# Patient Record
Sex: FEMALE | Race: WHITE | NOT HISPANIC OR LATINO | Employment: STUDENT | URBAN - METROPOLITAN AREA
[De-identification: names, ages, dates, MRNs, and addresses within clinical notes are randomized per-mention and may not be internally consistent; named-entity substitution may affect disease eponyms.]

---
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
## 2019-01-17 ENCOUNTER — VIRTUAL VISIT (OUTPATIENT)
Dept: FAMILY MEDICINE | Facility: OTHER | Age: 19
End: 2019-01-17

## 2019-01-17 NOTE — PROGRESS NOTES
"Date:   Clinician: Ricardo Mace  Clinician NPI: [id number]  Patient: Celina Fam  Patient : 2000  Patient Address: [address] [place] [address], [place] [address]  Patient Phone: [phone]  Visit Protocol: General skin conditions  Patient Summary:  Celina is a 18 year old ( : 2000 ) female who initiated a Visit for evaluation of an unspecified skin condition. When asked the question \"Please sign me up to receive news, health information and promotions from [facility].\", Celina responded \"No\".    Images of her skin condition were uploaded.  Her symptoms started 4-6 days ago and affect the right side of her body. The skin condition is located on her hands. The skin condition is yellow and white in color.   It feels tender to touch, painful, and warm to touch. The symptoms interfere with her sleep.   Symptom details   Pain: The pain is moderate (between 4-6 on a 10 point pain scale).   The skin condition has changed since the symptoms started. Description of changes as reported by the patient (free text): More swollen and infected   Denied symptoms include scabs, pimples, sores, burning, numbness, crusts, hives, dry/flaky skin, blisters, itchiness, and drainage. Celina does not feel feverish. She does not have a rash in the shape of a bull's-eye.   Treatments or home remedies used to relieve the symptoms as reported by the patient (free text): Antiobiotic ointment, did not help   Precipitating events   Celina did not come in contact with any irritants prior to the onset of her symptoms and has not been in close contact with anyone that has similar symptoms. She also did not spend time in a wooded area, swim, travel, or spend excess time in the sun just before her symptoms started. Celina did not get bitten or stung by an insect.   Pertinent medical history  Celina has not experienced this skin condition before.   Celina has not had chickenpox and has not had shingles in the past. She received the " varicella vaccine.    Celina does not have a history or a family history of atopia. Ongoing medical conditions were denied.   Weight: 118 lbs   Celina does not smoke or use smokeless tobacco.   She denies pregnancy and denies breastfeeding. She is currently menstruating.   MEDICATIONS: ADRIÁN (28) oral, ALLERGIES: NKDA  Clinician Response:  Dear Paul Patrick   Diagnosis: Cellulitis of unspecified finger  Diagnosis ICD: L03.019  Prescription: cephalexin (Keflex) 500 mg oral capsule 30 capsule, 10 days supply. Take 1 capsule by mouth every 8 hours for 10 days. Refills: 0, Refill as needed: no, Allow substitutions: yes  Pharmacy: [facility] - [phone] - [address] [place] [address]

## 2019-02-13 ENCOUNTER — VIRTUAL VISIT (OUTPATIENT)
Dept: FAMILY MEDICINE | Facility: CLINIC | Age: 19
End: 2019-02-13
Payer: COMMERCIAL

## 2019-02-13 DIAGNOSIS — L70.8 OTHER ACNE: ICD-10-CM

## 2019-02-13 DIAGNOSIS — L70.9 ACNE, UNSPECIFIED ACNE TYPE: Primary | ICD-10-CM

## 2019-02-13 PROCEDURE — 99441 ZZC PHYSICIAN TELEPHONE EVALUATION 5-10 MIN: CPT | Performed by: NURSE PRACTITIONER

## 2019-02-13 RX ORDER — MINOCYCLINE HYDROCHLORIDE 50 MG/1
50 CAPSULE ORAL 2 TIMES DAILY
Qty: 60 CAPSULE | Refills: 1 | Status: SHIPPED | OUTPATIENT
Start: 2019-02-13 | End: 2019-03-15 | Stop reason: ALTCHOICE

## 2019-02-13 RX ORDER — MINOCYCLINE HYDROCHLORIDE 50 MG/1
50 CAPSULE ORAL 2 TIMES DAILY
Qty: 60 CAPSULE | Refills: 1 | Status: SHIPPED | OUTPATIENT
Start: 2019-02-13 | End: 2019-02-13

## 2019-02-13 NOTE — PROGRESS NOTES
"Celina Fam is a 18 year old female who is being evaluated via a telephone visit.      The patient has been notified of following (by CHILO Davidson       \"We have found that certain health care needs can be provided without the need for a physical exam.  This service lets us provide the care you need with a short phone conversation.  If a prescription is necessary we can send it directly to your pharmacy.  If lab work is needed we can place an order for that and you can then stop by our lab to have the test done at a later time.    This telephone visit will be conducted via 3 way call with the you (the patient) , the physician/provider, and a me all on the line at the same time.  This allows your physician/provider to have the phone conversation with you while I will be taking notes for your medical record.  We will have full access to your [place] medical record during this entire phone call.    Since this is like an office visit,  will bill your insurance company for this service.  Please check with your medical insurance if this type of telephone/virtual is covered . You may be responsible for the cost of this service if insurance coverage is denied.  The typical cost is $30 (10min), $59(11-20min) and $85 (21-30min)     If during the course of the call the physician/provider feels a telephone visit is not appropriate, you will not be charged for this service\"    Consent has been obtained for this service by care team member: yes.  See the scanned image in the medical record.    S: The history as provided by the patient to the provider during this 3 way call include PMH, medication check    Pertinent parts of the the patient's medical history reviewed and confirmed by the provider included : PMH, medication.    Total time of call between patient and provider was 5 minutes     Susan Haase, CNP (MD signature)  ===================================================  Celina Fam is a 18 year old " female who is being evaluated via a telephone visit.      Issues with acne for the last year, present around mouth, forehead, smaller in size.  Denies on back or chest.  Using topical clindamycin and taking indigo without relief.     I have reviewed the note as documented above.  This accurately captures the substance of my conversation with the patient,      Assessment/Plan:  Celina was seen today for derm problem.    Diagnoses and all orders for this visit:    Acne, unspecified acne type: use OTC acne wash, apply clindamycin bid, continue indigo, take minocycline bid with food  -     minocycline (MINOCIN/DYNACIN) 50 MG capsule; Take 1 capsule (50 mg) by mouth 2 times daily  -     benzoyl peroxide (BREVOXYL) 4 % external gel; Apply to face once daily at night    Follow up in 3 months, sooner as needed.

## 2019-03-13 ENCOUNTER — TELEPHONE (OUTPATIENT)
Dept: FAMILY MEDICINE | Facility: CLINIC | Age: 19
End: 2019-03-13

## 2019-03-13 DIAGNOSIS — L70.9 ACNE, UNSPECIFIED ACNE TYPE: Primary | ICD-10-CM

## 2019-03-13 NOTE — TELEPHONE ENCOUNTER
Pt has Derm appt on June 4th. Pt wondering if she could try amoxicillin for her acne. Friend uses it and has worked great. Please use the [facility] pharmacy.    Blanquita San/

## 2019-03-14 NOTE — TELEPHONE ENCOUNTER
Amoxicillin does not treat the organisim C. acnes that causes acne, only the medications that are in the cycline group treat this bacteria, the other medication that is recommended if you can not tolerate or allergic to  cycline is bactrim.   Thanks,  Susan Haase, CNP

## 2019-03-14 NOTE — TELEPHONE ENCOUNTER
Pt is willing to try this if you think it would be beneficial since the minocycline is not working

## 2019-03-15 RX ORDER — SULFAMETHOXAZOLE/TRIMETHOPRIM 800-160 MG
1 TABLET ORAL 2 TIMES DAILY
Qty: 60 TABLET | Refills: 0 | Status: SHIPPED | OUTPATIENT
Start: 2019-03-15 | End: 2019-07-22

## 2019-03-25 ENCOUNTER — TELEPHONE (OUTPATIENT)
Dept: FAMILY MEDICINE | Facility: CLINIC | Age: 19
End: 2019-03-25

## 2019-03-25 DIAGNOSIS — Z30.41 ENCOUNTER FOR SURVEILLANCE OF CONTRACEPTIVE PILLS: ICD-10-CM

## 2019-03-25 RX ORDER — DROSPIRENONE AND ETHINYL ESTRADIOL 0.02-3(28)
1 KIT ORAL DAILY
Qty: 84 TABLET | Refills: 2 | Status: SHIPPED | OUTPATIENT
Start: 2019-03-25 | End: 2019-07-22

## 2019-03-25 NOTE — TELEPHONE ENCOUNTER
Has refills  Prescription approved per [facility] Refill Protocol.  Elisabeth Brice RN, BSN

## 2019-07-22 ENCOUNTER — OFFICE VISIT (OUTPATIENT)
Dept: FAMILY MEDICINE | Facility: CLINIC | Age: 19
End: 2019-07-22
Payer: COMMERCIAL

## 2019-07-22 VITALS
RESPIRATION RATE: 16 BRPM | TEMPERATURE: 98.1 F | HEIGHT: 61 IN | OXYGEN SATURATION: 99 % | BODY MASS INDEX: 23.79 KG/M2 | DIASTOLIC BLOOD PRESSURE: 62 MMHG | SYSTOLIC BLOOD PRESSURE: 90 MMHG | WEIGHT: 126 LBS | HEART RATE: 79 BPM

## 2019-07-22 DIAGNOSIS — L40.9 PSORIASIS OF SCALP: Primary | ICD-10-CM

## 2019-07-22 DIAGNOSIS — Z11.3 SCREEN FOR STD (SEXUALLY TRANSMITTED DISEASE): ICD-10-CM

## 2019-07-22 DIAGNOSIS — Z30.41 ENCOUNTER FOR SURVEILLANCE OF CONTRACEPTIVE PILLS: ICD-10-CM

## 2019-07-22 PROCEDURE — 87491 CHLMYD TRACH DNA AMP PROBE: CPT | Performed by: NURSE PRACTITIONER

## 2019-07-22 PROCEDURE — 99213 OFFICE O/P EST LOW 20 MIN: CPT | Performed by: NURSE PRACTITIONER

## 2019-07-22 PROCEDURE — 87591 N.GONORRHOEAE DNA AMP PROB: CPT | Performed by: NURSE PRACTITIONER

## 2019-07-22 RX ORDER — KETOCONAZOLE 20 MG/ML
SHAMPOO TOPICAL
Qty: 500 ML | Refills: 1 | Status: SHIPPED | OUTPATIENT
Start: 2019-07-22 | End: 2020-12-10

## 2019-07-22 RX ORDER — DESOXIMETASONE 2.5 MG/G
CREAM TOPICAL 2 TIMES DAILY
Qty: 60 G | Refills: 0 | Status: SHIPPED | OUTPATIENT
Start: 2019-07-22 | End: 2020-12-10

## 2019-07-22 RX ORDER — DROSPIRENONE AND ETHINYL ESTRADIOL 0.02-3(28)
1 KIT ORAL DAILY
Qty: 84 TABLET | Refills: 3 | Status: SHIPPED | OUTPATIENT
Start: 2019-07-22 | End: 2020-11-25

## 2019-07-22 ASSESSMENT — ANXIETY QUESTIONNAIRES
3. WORRYING TOO MUCH ABOUT DIFFERENT THINGS: SEVERAL DAYS
1. FEELING NERVOUS, ANXIOUS, OR ON EDGE: SEVERAL DAYS
IF YOU CHECKED OFF ANY PROBLEMS ON THIS QUESTIONNAIRE, HOW DIFFICULT HAVE THESE PROBLEMS MADE IT FOR YOU TO DO YOUR WORK, TAKE CARE OF THINGS AT HOME, OR GET ALONG WITH OTHER PEOPLE: NOT DIFFICULT AT ALL
5. BEING SO RESTLESS THAT IT IS HARD TO SIT STILL: NOT AT ALL
6. BECOMING EASILY ANNOYED OR IRRITABLE: SEVERAL DAYS
2. NOT BEING ABLE TO STOP OR CONTROL WORRYING: NOT AT ALL

## 2019-07-22 ASSESSMENT — PATIENT HEALTH QUESTIONNAIRE - PHQ9
SUM OF ALL RESPONSES TO PHQ QUESTIONS 1-9: 2
5. POOR APPETITE OR OVEREATING: NOT AT ALL

## 2019-07-22 ASSESSMENT — MIFFLIN-ST. JEOR: SCORE: 1278.91

## 2019-07-22 NOTE — LETTER
July 24, 2019      Cleina Fam  [address] [facility] [address]        Dear ,    We are writing to inform you of your test results.    Your tests for gonorrhea and chlamydia are negative.     Resulted Orders   NEISSERIA GONORRHOEA PCR   Result Value Ref Range    Specimen Descrip Vagina     N Gonorrhea PCR Negative NEG^Negative      Comment:      Negative for N. gonorrhoeae rRNA by transcription mediated amplification.  A negative result by transcription mediated amplification does not preclude   the presence of N. gonorrhoeae infection because results are dependent on   proper and adequate collection, absence of inhibitors, and sufficient rRNA to   be detected.     CHLAMYDIA TRACHOMATIS PCR   Result Value Ref Range    Specimen Description Vagina     Chlamydia Trachomatis PCR Negative NEG^Negative      Comment:      Negative for C. trachomatis rRNA by transcription mediated amplification.  A negative result by transcription mediated amplification does not preclude   the presence of C. trachomatis infection because results are dependent on   proper and adequate collection, absence of inhibitors, and sufficient rRNA to   be detected.         If you have any questions or concerns, please call the clinic at the number listed above.       Sincerely,        Susan Haase, APRN CNP

## 2019-07-22 NOTE — PROGRESS NOTES
"Subjective     Celina Fam is a 19 year old female who presents to clinic today for the following health issues:    HPI   Rash    Description:   Location: scalp  Character: flaky  Itching (Pruritis): YES    Progression of Symptoms:  worsening    Accompanying Signs & Symptoms:  Fever: no   Body aches or joint pain: no   Sore throat symptoms: no   Recent cold symptoms: no     History:   Previous similar rash: YES    Precipitating factors:   Exposure to similar rash: no   New exposures: None   Recent travel: no     Alleviating factors:  Left side of scalp with itching, dandruff, has been using head and shoulders without relief.  Area of dryness and itching has increased in size.       Therapies Tried and outcome: see above.     Birth control: taking indigo, good control of acne.               Reviewed and updated as needed this visit by Provider         Review of Systems   ROS COMP: CONSTITUTIONAL: NEGATIVE for fever, chills, change in weight  INTEGUMENTARY/SKIN: see HPI  RESP: NEGATIVE for significant cough or SOB  CV: NEGATIVE for chest pain, palpitations or peripheral edema  : normal menstrual cycles  PSYCHIATRIC: NEGATIVE for changes in mood or affect      Objective    BP 90/62 (BP Location: Right arm, Patient Position: Chair, Cuff Size: Adult Regular)   Pulse 79   Temp 98.1  F (36.7  C) (Oral)   Resp 16   Ht 1.549 m (5' 1\")   Wt 57.2 kg (126 lb)   SpO2 99%   BMI 23.81 kg/m    Body mass index is 23.81 kg/m .  Physical Exam   GENERAL: healthy, alert and no distress  SKIN: left side of scalp with a 3 cm dry and scaly plaque.   PSYCH: mentation appears normal, affect normal/bright          Assessment & Plan   Assessment      Plan  1. Psoriasis of scalp: left side of scalp, see below    - ketoconazole (NIZORAL) 2 % external shampoo; Apply shampoo to scalp every week,  wash off after 5 minutes.  Dispense: 500 mL; Refill: 1  - desoximetasone (TOPICORT) 0.25 % external cream; Apply topically 2 times daily  " Dispense: 60 g; Refill: 0    2. Screen for STD (sexually transmitted disease)  - NEISSERIA GONORRHOEA PCR  - CHLAMYDIA TRACHOMATIS PCR      Return in about 1 year (around 7/22/2020) for Routine Visit.    Susan Haase, APRN [facility]

## 2020-11-25 DIAGNOSIS — Z30.41 ENCOUNTER FOR SURVEILLANCE OF CONTRACEPTIVE PILLS: ICD-10-CM

## 2020-11-25 RX ORDER — DROSPIRENONE AND ETHINYL ESTRADIOL TABLETS 0.02-3(28)
KIT ORAL
Qty: 84 TABLET | Refills: 0 | Status: SHIPPED | OUTPATIENT
Start: 2020-11-25 | End: 2020-12-10

## 2020-12-02 ENCOUNTER — IMMUNIZATION (OUTPATIENT)
Dept: FAMILY MEDICINE | Facility: CLINIC | Age: 20
End: 2020-12-02
Payer: COMMERCIAL

## 2020-12-02 DIAGNOSIS — Z23 NEED FOR PROPHYLACTIC VACCINATION AND INOCULATION AGAINST INFLUENZA: Primary | ICD-10-CM

## 2020-12-02 PROCEDURE — 90473 IMMUNE ADMIN ORAL/NASAL: CPT

## 2020-12-02 PROCEDURE — 99207 PR NO CHARGE NURSE ONLY: CPT

## 2020-12-02 PROCEDURE — 90672 LAIV4 VACCINE INTRANASAL: CPT

## 2020-12-10 ENCOUNTER — OFFICE VISIT (OUTPATIENT)
Dept: FAMILY MEDICINE | Facility: CLINIC | Age: 20
End: 2020-12-10
Payer: COMMERCIAL

## 2020-12-10 VITALS
RESPIRATION RATE: 16 BRPM | HEART RATE: 78 BPM | TEMPERATURE: 98.2 F | WEIGHT: 125.6 LBS | BODY MASS INDEX: 23.71 KG/M2 | OXYGEN SATURATION: 100 % | HEIGHT: 61 IN | SYSTOLIC BLOOD PRESSURE: 100 MMHG | DIASTOLIC BLOOD PRESSURE: 66 MMHG

## 2020-12-10 DIAGNOSIS — Z11.59 NEED FOR HEPATITIS C SCREENING TEST: ICD-10-CM

## 2020-12-10 DIAGNOSIS — Z30.41 ENCOUNTER FOR SURVEILLANCE OF CONTRACEPTIVE PILLS: ICD-10-CM

## 2020-12-10 DIAGNOSIS — Z12.4 SCREENING FOR MALIGNANT NEOPLASM OF CERVIX: ICD-10-CM

## 2020-12-10 DIAGNOSIS — F32.5 MAJOR DEPRESSION IN REMISSION (H): ICD-10-CM

## 2020-12-10 DIAGNOSIS — Z00.00 ROUTINE GENERAL MEDICAL EXAMINATION AT A HEALTH CARE FACILITY: Primary | ICD-10-CM

## 2020-12-10 DIAGNOSIS — Z11.3 SCREENING FOR STDS (SEXUALLY TRANSMITTED DISEASES): ICD-10-CM

## 2020-12-10 DIAGNOSIS — D64.9 ANEMIA, UNSPECIFIED TYPE: ICD-10-CM

## 2020-12-10 DIAGNOSIS — Z11.4 SCREENING FOR HIV (HUMAN IMMUNODEFICIENCY VIRUS): ICD-10-CM

## 2020-12-10 LAB
BASOPHILS # BLD AUTO: 0 10E9/L (ref 0–0.2)
BASOPHILS NFR BLD AUTO: 0.7 %
DIFFERENTIAL METHOD BLD: ABNORMAL
EOSINOPHIL # BLD AUTO: 0.1 10E9/L (ref 0–0.7)
EOSINOPHIL NFR BLD AUTO: 1.1 %
ERYTHROCYTE [DISTWIDTH] IN BLOOD BY AUTOMATED COUNT: 18.2 % (ref 10–15)
HCT VFR BLD AUTO: 38.8 % (ref 35–47)
HGB BLD-MCNC: 12.2 G/DL (ref 11.7–15.7)
LYMPHOCYTES # BLD AUTO: 2.1 10E9/L (ref 0.8–5.3)
LYMPHOCYTES NFR BLD AUTO: 39.2 %
MCH RBC QN AUTO: 24.4 PG (ref 26.5–33)
MCHC RBC AUTO-ENTMCNC: 31.4 G/DL (ref 31.5–36.5)
MCV RBC AUTO: 77 FL (ref 78–100)
MONOCYTES # BLD AUTO: 0.4 10E9/L (ref 0–1.3)
MONOCYTES NFR BLD AUTO: 6.8 %
NEUTROPHILS # BLD AUTO: 2.8 10E9/L (ref 1.6–8.3)
NEUTROPHILS NFR BLD AUTO: 52.2 %
PLATELET # BLD AUTO: 303 10E9/L (ref 150–450)
RBC # BLD AUTO: 5.01 10E12/L (ref 3.8–5.2)
WBC # BLD AUTO: 5.4 10E9/L (ref 4–11)

## 2020-12-10 PROCEDURE — 90471 IMMUNIZATION ADMIN: CPT | Performed by: NURSE PRACTITIONER

## 2020-12-10 PROCEDURE — 87389 HIV-1 AG W/HIV-1&-2 AB AG IA: CPT | Performed by: NURSE PRACTITIONER

## 2020-12-10 PROCEDURE — 99395 PREV VISIT EST AGE 18-39: CPT | Mod: 25 | Performed by: NURSE PRACTITIONER

## 2020-12-10 PROCEDURE — G0145 SCR C/V CYTO,THINLAYER,RESCR: HCPCS | Performed by: NURSE PRACTITIONER

## 2020-12-10 PROCEDURE — 90715 TDAP VACCINE 7 YRS/> IM: CPT | Performed by: NURSE PRACTITIONER

## 2020-12-10 PROCEDURE — 86803 HEPATITIS C AB TEST: CPT | Performed by: NURSE PRACTITIONER

## 2020-12-10 PROCEDURE — 85025 COMPLETE CBC W/AUTO DIFF WBC: CPT | Performed by: NURSE PRACTITIONER

## 2020-12-10 PROCEDURE — 83550 IRON BINDING TEST: CPT | Performed by: NURSE PRACTITIONER

## 2020-12-10 PROCEDURE — 83540 ASSAY OF IRON: CPT | Performed by: NURSE PRACTITIONER

## 2020-12-10 PROCEDURE — 36415 COLL VENOUS BLD VENIPUNCTURE: CPT | Performed by: NURSE PRACTITIONER

## 2020-12-10 RX ORDER — DROSPIRENONE AND ETHINYL ESTRADIOL 0.02-3(28)
1 KIT ORAL DAILY
Qty: 84 TABLET | Refills: 3 | Status: SHIPPED | OUTPATIENT
Start: 2020-12-10 | End: 2020-12-10

## 2020-12-10 RX ORDER — DROSPIRENONE AND ETHINYL ESTRADIOL 0.02-3(28)
1 KIT ORAL DAILY
Qty: 84 TABLET | Refills: 3 | Status: SHIPPED | OUTPATIENT
Start: 2020-12-10 | End: 2021-09-21

## 2020-12-10 ASSESSMENT — ENCOUNTER SYMPTOMS
HEARTBURN: 0
WEAKNESS: 0
MYALGIAS: 0
NERVOUS/ANXIOUS: 0
SHORTNESS OF BREATH: 0
DIZZINESS: 0
NAUSEA: 0
BREAST MASS: 0
DYSURIA: 0
HEADACHES: 0
SORE THROAT: 0
DIARRHEA: 0
CONSTIPATION: 0
JOINT SWELLING: 0
ABDOMINAL PAIN: 0
HEMATOCHEZIA: 0
FEVER: 0
ARTHRALGIAS: 0
HEMATURIA: 0
PARESTHESIAS: 0
FREQUENCY: 0
PALPITATIONS: 0
COUGH: 0
CHILLS: 0
EYE PAIN: 0

## 2020-12-10 ASSESSMENT — PATIENT HEALTH QUESTIONNAIRE - PHQ9
SUM OF ALL RESPONSES TO PHQ QUESTIONS 1-9: 0
SUM OF ALL RESPONSES TO PHQ QUESTIONS 1-9: 0
10. IF YOU CHECKED OFF ANY PROBLEMS, HOW DIFFICULT HAVE THESE PROBLEMS MADE IT FOR YOU TO DO YOUR WORK, TAKE CARE OF THINGS AT HOME, OR GET ALONG WITH OTHER PEOPLE: NOT DIFFICULT AT ALL

## 2020-12-10 ASSESSMENT — ANXIETY QUESTIONNAIRES
GAD7 TOTAL SCORE: 1
5. BEING SO RESTLESS THAT IT IS HARD TO SIT STILL: NOT AT ALL
4. TROUBLE RELAXING: NOT AT ALL
1. FEELING NERVOUS, ANXIOUS, OR ON EDGE: NOT AT ALL
GAD7 TOTAL SCORE: 1
GAD7 TOTAL SCORE: 1
2. NOT BEING ABLE TO STOP OR CONTROL WORRYING: NOT AT ALL
7. FEELING AFRAID AS IF SOMETHING AWFUL MIGHT HAPPEN: NOT AT ALL
3. WORRYING TOO MUCH ABOUT DIFFERENT THINGS: NOT AT ALL
7. FEELING AFRAID AS IF SOMETHING AWFUL MIGHT HAPPEN: NOT AT ALL
6. BECOMING EASILY ANNOYED OR IRRITABLE: SEVERAL DAYS

## 2020-12-10 ASSESSMENT — MIFFLIN-ST. JEOR: SCORE: 1272.1

## 2020-12-10 NOTE — PROGRESS NOTES
SUBJECTIVE:   CC: Celina Fam is an 20 year old woman who presents for preventive health visit.     Patient has been advised of split billing requirements and indicates understanding: Yes     HPI  Answers for HPI/ROS submitted by the patient on 12/10/2020   Annual Exam:  If you checked off any problems, how difficult have these problems made it for you to do your work, take care of things at home, or get along with other people?: Not difficult at all  PHQ9 TOTAL SCORE: 0  HALI 7 TOTAL SCORE: 1    Recheck iron levels.   Mole check: 2 on her back and 1 behind left ear, unsure if they have gotten larger.    Menses: monthly, light, minimal cramping, taking OCP.  Follows vegan diet, is taking multivitamin with iron, requests iron check.   Depression: PHQ 9 of 0.   Social: will be finishing her final semester of college this spring, will be working as an  when done.   Today's PHQ-2 Score:   PHQ-2 ( 1999 [employer]) 7/22/2019   Q1: Little interest or pleasure in doing things 0   Q2: Feeling down, depressed or hopeless 0   PHQ-2 Score 0   Q1: Little interest or pleasure in doing things -   Q2: Feeling down, depressed or hopeless -   PHQ-2 Score -     Abuse: Current or Past (Physical, Sexual or Emotional) - No  Do you feel safe in your environment? Yes    Social History     Tobacco Use     Smoking status: Never Smoker     Smokeless tobacco: Never Used   Substance Use Topics     Alcohol use: Yes     Comment: occ       Alcohol Use 5/22/2018   Prescreen: >3 drinks/day or >7 drinks/week? No   Prescreen: >3 drinks/day or >7 drinks/week? -     Reviewed orders with patient.  Reviewed health maintenance and updated orders accordingly - Yes  Lab work is in process  Labs reviewed in EPIC  BP Readings from Last 3 Encounters:   12/10/20 100/66   07/22/19 90/62   05/22/18 100/60    Wt Readings from Last 3 Encounters:   12/10/20 57 kg (125 lb 9.6 oz)   07/22/19 57.2 kg (126 lb)   05/22/18 55.3 kg (122 lb) (40 %, Z= -0.24)*      * Growth percentiles are based on CDC (Girls, 2-20 Years) data.                  Patient Active Problem List   Diagnosis     Acne     Anxiety     Mild recurrent major depression (H)     Past Surgical History:   Procedure Laterality Date     TONSILLECTOMY, ADENOIDECTOMY, COMBINED  age 6 or 7     TYMPANOPLASTY, RT/LT  toddler and age 4    Tympanoplasty RT/LT       Social History     Tobacco Use     Smoking status: Never Smoker     Smokeless tobacco: Never Used   Substance Use Topics     Alcohol use: Yes     Comment: occ     Family History   Problem Relation Age of Onset     Family History Negative Mother      Family History Negative Father      Thyroid Disease Maternal Grandmother         hyperactive     Alzheimer Disease Paternal Grandfather      Family History Negative Sister      Family History Negative Brother          Current Outpatient Medications   Medication Sig Dispense Refill     desoximetasone (TOPICORT) 0.25 % external cream Apply topically 2 times daily 60 g 0     ketoconazole (NIZORAL) 2 % external shampoo Apply shampoo to scalp every week,  wash off after 5 minutes. 500 mL 1     LORYNA 3-0.02 MG tablet TAKE ONE TABLET BY MOUTH ONCE DAILY 84 tablet 0     Allergies   Allergen Reactions     Bactrim [Sulfamethoxazole W/Trimethoprim] Itching     Nkda [No Known Drug Allergies]        Mammogram not appropriate for this patient based on age.    Pertinent mammograms are reviewed under the imaging tab.  History of abnormal Pap smear: NO - age 21-29 PAP every 3 years recommended     Reviewed and updated as needed this visit by clinical staff                 Reviewed and updated as needed this visit by Provider                  Review of Systems  CONSTITUTIONAL: NEGATIVE for fever, chills, change in weight  INTEGUMENTARU/SKIN: see HPI  EYES: NEGATIVE for vision changes or irritation  ENT: NEGATIVE for ear, mouth and throat problems  RESP: NEGATIVE for significant cough or SOB  BREAST: NEGATIVE for masses,  "tenderness or discharge  CV: NEGATIVE for chest pain, palpitations or peripheral edema  GI: NEGATIVE for nausea, abdominal pain, heartburn, or change in bowel habits  : NEGATIVE for unusual urinary or vaginal symptoms. Periods are regular.  MUSCULOSKELETAL: NEGATIVE for significant arthralgias or myalgia  NEURO: NEGATIVE for weakness, dizziness or paresthesias  PSYCHIATRIC: NEGATIVE for changes in mood or affect     OBJECTIVE:   /66 (BP Location: Right arm, Patient Position: Chair, Cuff Size: Adult Regular)   Pulse 78   Temp 98.2  F (36.8  C) (Oral)   Resp 16   Ht 1.549 m (5' 1\")   Wt 57 kg (125 lb 9.6 oz)   SpO2 100%   BMI 23.73 kg/m    Physical Exam  GENERAL: healthy, alert and no distress  EYES: Eyes grossly normal to inspection, PERRL and conjunctivae and sclerae normal  HENT: ear canals and TM's normal, nose and mouth without ulcers or lesions  NECK: no adenopathy, no asymmetry, masses, or scars and thyroid normal to palpation  RESP: lungs clear to auscultation - no rales, rhonchi or wheezes  BREAST: normal without masses, tenderness or nipple discharge and no palpable axillary masses or adenopathy  CV: regular rate and rhythm, normal S1 S2, no S3 or S4, no murmur, click or rub, no peripheral edema  ABDOMEN: soft, nontender, no hepatosplenomegaly, no masses and bowel sounds normal   (female): normal female external genitalia, normal urethral meatus, vaginal mucosa pink, moist, well rugated, and normal cervix/adnexa/uterus without masses or discharge, 5 mm nevus in center of mons pubis  MS: no gross musculoskeletal defects noted, no edema  SKIN: numerous moles on back, raised nevus behind the left ear,   NEURO: Normal strength and tone, mentation intact and speech normal  PSYCH: mentation appears normal, affect normal/bright        ASSESSMENT/PLAN:   Celina was seen today for physical.    Diagnoses and all orders for this visit:    Routine general medical examination at a health care facility  -  " "   DERMATOLOGY ADULT REFERRAL; Future    Screening for HIV (human immunodeficiency virus)  -     HIV Antigen Antibody Combo    Need for hepatitis C screening test  -     Hepatitis C Screen Reflex to HCV RNA Quant and Genotype    Screening for malignant neoplasm of cervix  -     Pap imaged thin layer screen only - recommended age 21 - 24 years    Screening for STDs (sexually transmitted diseases): unable to complete gonorrhea and chlamydia due to test shortage, asymptomatic    Encounter for surveillance of contraceptive pills: refill.  -       -     drospirenone-ethinyl estradiol (LORYNA) 3-0.02 MG tablet; Take 1 tablet by mouth daily    Anemia, unspecified type:  Follows vegan diet  -     CBC with platelets differential  -     Iron and iron binding capacity    Major depression in remission (H) :PHQ 9 of 0    Other orders  -     REVIEW OF HEALTH MAINTENANCE PROTOCOL ORDERS  -     TDAP VACCINE (Adacel, Boostrix)  [[id number]]        Patient has been advised of split billing requirements and indicates understanding: No  COUNSELING:  Reviewed preventive health counseling, as reflected in patient instructions       Regular exercise       Healthy diet/nutrition    Estimated body mass index is 23.81 kg/m  as calculated from the following:    Height as of 7/22/19: 1.549 m (5' 1\").    Weight as of 7/22/19: 57.2 kg (126 lb).        She reports that she has never smoked. She has never used smokeless tobacco.      Counseling Resources:  ATP IV Guidelines  Pooled Cohorts Equation Calculator  Breast Cancer Risk Calculator  BRCA-Related Cancer Risk Assessment: FHS-7 Tool  FRAX Risk Assessment  ICSI Preventive Guidelines  Dietary Guidelines for Americans, 2010  USDA's MyPlate  ASA Prophylaxis  Lung CA Screening  Follow up in 1 year, sooner as needed.   Susan Haase, APRN [facility]"

## 2020-12-10 NOTE — LETTER
[facility]  [address] [place], [place], [address]  [phone]        December 14, 2020    Celina Fam                                                                                                                                                       [address] [facility] [address]          Shan Patrick,     Your lab results are as below:     1)  Hemoglobin is normal at 12.2, the MCV and MCH are low which indicate slight iron deficiency anemia.   2)  Iron saturation is low and iron binding is elevated, these tests also indicate iron deficiency.  Please take an iron supplement on a daily basis.   3)  Screening for HIV and Hepatitis C are negative.     If you have any questions do not hesitate to call the clinic to discuss the results with me further.     Sincerely,     Susan Haase, CNP

## 2020-12-11 LAB
HCV AB SERPL QL IA: NONREACTIVE
HIV 1+2 AB+HIV1 P24 AG SERPL QL IA: NONREACTIVE
IRON SATN MFR SERPL: 9 % (ref 15–46)
IRON SERPL-MCNC: 53 UG/DL (ref 35–180)
TIBC SERPL-MCNC: 558 UG/DL (ref 240–430)

## 2020-12-11 ASSESSMENT — ANXIETY QUESTIONNAIRES: GAD7 TOTAL SCORE: 1

## 2020-12-14 LAB
COPATH REPORT: NORMAL
PAP: NORMAL

## 2021-03-15 ENCOUNTER — VIRTUAL VISIT (OUTPATIENT)
Dept: FAMILY MEDICINE | Facility: CLINIC | Age: 21
End: 2021-03-15
Payer: COMMERCIAL

## 2021-03-15 DIAGNOSIS — F41.9 ANXIETY: Primary | ICD-10-CM

## 2021-03-15 DIAGNOSIS — R41.840 ATTENTION AND CONCENTRATION DEFICIT: ICD-10-CM

## 2021-03-15 PROCEDURE — 99213 OFFICE O/P EST LOW 20 MIN: CPT | Mod: TEL | Performed by: NURSE PRACTITIONER

## 2021-03-15 RX ORDER — BUPROPION HYDROCHLORIDE 150 MG/1
150 TABLET ORAL EVERY MORNING
Qty: 30 TABLET | Refills: 1 | Status: SHIPPED | OUTPATIENT
Start: 2021-03-15 | End: 2021-04-26

## 2021-03-15 ASSESSMENT — ANXIETY QUESTIONNAIRES
IF YOU CHECKED OFF ANY PROBLEMS ON THIS QUESTIONNAIRE, HOW DIFFICULT HAVE THESE PROBLEMS MADE IT FOR YOU TO DO YOUR WORK, TAKE CARE OF THINGS AT HOME, OR GET ALONG WITH OTHER PEOPLE: VERY DIFFICULT
5. BEING SO RESTLESS THAT IT IS HARD TO SIT STILL: NEARLY EVERY DAY
GAD7 TOTAL SCORE: 5
2. NOT BEING ABLE TO STOP OR CONTROL WORRYING: NOT AT ALL
3. WORRYING TOO MUCH ABOUT DIFFERENT THINGS: NOT AT ALL
7. FEELING AFRAID AS IF SOMETHING AWFUL MIGHT HAPPEN: NOT AT ALL
6. BECOMING EASILY ANNOYED OR IRRITABLE: NOT AT ALL
1. FEELING NERVOUS, ANXIOUS, OR ON EDGE: NOT AT ALL

## 2021-03-15 ASSESSMENT — PATIENT HEALTH QUESTIONNAIRE - PHQ9
5. POOR APPETITE OR OVEREATING: MORE THAN HALF THE DAYS
SUM OF ALL RESPONSES TO PHQ QUESTIONS 1-9: 5

## 2021-03-15 NOTE — PROGRESS NOTES
Celina is a 20 year old who is being evaluated via a billable telephone visit.      What phone number would you like to be contacted at? [phone]  How would you like to obtain your AVS? Eugene    Assessment & Plan     Anxiety:  HALI 7 score of 5, Adult ADHD self report ASRS with 4/6 items on ADHD score which indicates a strong likely farley of ADHD, will start on Wellbutrin to see if it helps with focus,     - bupropion (WELLBUTRIN XL) 150 MG 24 hr tablet; Take 1 tablet (150 mg) by mouth every morning    Attention and concentration deficit:  See above     :150    Return in about 6 weeks (around 4/26/2021) for Routine Visit, 6 weeks virtual for anxiety.    Susan Haase, APRN CNP  [facility]    Subjective     HPI     Concern -  memory concern  Onset: several years  Description: short term memory concern  Progression of Symptoms:  worsening  Accompanying Signs & Symptoms: none  Previous history of similar problem: none  Precipitating factors:        Worsened by: none  Alleviating factors:        Improved by: slowing down during the day  Therapies tried and outcome: trying to me more aware of surroundings  Main symptoms are difficulty with wrapping up details of a project, difficulty sitting still without fidgeting, constantly feeling compelled to do things, misplace or have difficulty finding things, feel restless, have difficulty unwinding.   Her Father has ADD and she sees her actions being just like his.  She is very nervous about finishing college and starting a job, she is worried these behavior are going to impact her getting and keeping a job.        Review of Systems   CONSTITUTIONAL: NEGATIVE for fever, chills, change in weight  RESP: NEGATIVE for significant cough or SOB  CV: NEGATIVE for chest pain, palpitations or peripheral edema  NEURO: NEGATIVE for weakness, dizziness or paresthesias  PSYCHIATRIC: see HPI      Objective           Vitals:  No vitals were obtained today due to  virtual visit.    Physical Exam   PSYCH: Alert and oriented times 3; coherent speech, normal   rate and volume, able to articulate logical thoughts, able   to abstract reason, no tangential thoughts, no hallucinations   or delusions  Her affect is normal and pleasant  RESP: No cough, no audible wheezing, able to talk in full sentences  Remainder of exam unable to be completed due to telephone visits              Phone call duration:  20 minutes

## 2021-03-16 ASSESSMENT — ANXIETY QUESTIONNAIRES: GAD7 TOTAL SCORE: 5

## 2021-04-26 ENCOUNTER — VIRTUAL VISIT (OUTPATIENT)
Dept: FAMILY MEDICINE | Facility: CLINIC | Age: 21
End: 2021-04-26
Payer: COMMERCIAL

## 2021-04-26 DIAGNOSIS — F41.9 ANXIETY: Primary | ICD-10-CM

## 2021-04-26 PROCEDURE — 99213 OFFICE O/P EST LOW 20 MIN: CPT | Mod: 95 | Performed by: NURSE PRACTITIONER

## 2021-04-26 ASSESSMENT — PATIENT HEALTH QUESTIONNAIRE - PHQ9
5. POOR APPETITE OR OVEREATING: SEVERAL DAYS
SUM OF ALL RESPONSES TO PHQ QUESTIONS 1-9: 4

## 2021-04-26 ASSESSMENT — ANXIETY QUESTIONNAIRES
GAD7 TOTAL SCORE: 2
5. BEING SO RESTLESS THAT IT IS HARD TO SIT STILL: SEVERAL DAYS
IF YOU CHECKED OFF ANY PROBLEMS ON THIS QUESTIONNAIRE, HOW DIFFICULT HAVE THESE PROBLEMS MADE IT FOR YOU TO DO YOUR WORK, TAKE CARE OF THINGS AT HOME, OR GET ALONG WITH OTHER PEOPLE: NOT DIFFICULT AT ALL
7. FEELING AFRAID AS IF SOMETHING AWFUL MIGHT HAPPEN: NOT AT ALL
6. BECOMING EASILY ANNOYED OR IRRITABLE: NOT AT ALL
2. NOT BEING ABLE TO STOP OR CONTROL WORRYING: NOT AT ALL
3. WORRYING TOO MUCH ABOUT DIFFERENT THINGS: NOT AT ALL
1. FEELING NERVOUS, ANXIOUS, OR ON EDGE: NOT AT ALL

## 2021-04-26 NOTE — PROGRESS NOTES
Celina is a 21 year old who is being evaluated via a billable telephone visit.      What phone number would you like to be contacted at? [phone]  How would you like to obtain your AVS? Eugene    Assessment & Plan     Anxiety  : continues to have difficulty with focus, etc while taking Wellbutrin, will stop medication.  Discussed follow up for formal ADD testing at [facility] or [facility].     Follow up in about 3 months after ADD testing has been completed.    Susan Haase, APRN [facility]   Celina is a 21 year old who presents for the following health issues     HPI     Medication Follow up of Wellbutrin    Taking Medication as prescribed: yes    Side Effects:  None    Medication Helping Symptoms:  Haven't noticed    ADD symptoms:  Was seen on 3/15 for evaluation for ADD, we started on Wellbutrin 150 mg daily, has been taking as prescribed without any improvement in ADD symptoms.          Review of Systems   CONSTITUTIONAL: NEGATIVE for fever, chills, change in weight  RESP: NEGATIVE for significant cough or SOB  CV: NEGATIVE for chest pain, palpitations or peripheral edema  PSYCHIATRIC: see HPI      Objective         Vitals:  No vitals were obtained today due to virtual visit.    Physical Exam   PSYCH: Alert and oriented times 3; coherent speech, normal   rate and volume, able to articulate logical thoughts, able   to abstract reason, no tangential thoughts, no hallucinations   or delusions  Her affect is normal  RESP: No cough, no audible wheezing, able to talk in full sentences  Remainder of exam unable to be completed due to telephone visits        Phone call duration: 7 minutes

## 2021-04-27 ASSESSMENT — ANXIETY QUESTIONNAIRES: GAD7 TOTAL SCORE: 2

## 2021-07-14 ENCOUNTER — OFFICE VISIT (OUTPATIENT)
Dept: FAMILY MEDICINE | Facility: CLINIC | Age: 21
End: 2021-07-14
Payer: COMMERCIAL

## 2021-07-14 ENCOUNTER — TELEPHONE (OUTPATIENT)
Dept: FAMILY MEDICINE | Facility: CLINIC | Age: 21
End: 2021-07-14

## 2021-07-14 VITALS
RESPIRATION RATE: 16 BRPM | OXYGEN SATURATION: 100 % | DIASTOLIC BLOOD PRESSURE: 62 MMHG | SYSTOLIC BLOOD PRESSURE: 115 MMHG | WEIGHT: 132 LBS | BODY MASS INDEX: 24.94 KG/M2 | HEART RATE: 77 BPM

## 2021-07-14 DIAGNOSIS — R19.7 DIARRHEA, UNSPECIFIED TYPE: ICD-10-CM

## 2021-07-14 DIAGNOSIS — D50.9 IRON DEFICIENCY ANEMIA, UNSPECIFIED IRON DEFICIENCY ANEMIA TYPE: ICD-10-CM

## 2021-07-14 DIAGNOSIS — R74.8 ELEVATED LIVER ENZYMES: Primary | ICD-10-CM

## 2021-07-14 DIAGNOSIS — K29.00 ACUTE GASTRITIS WITHOUT HEMORRHAGE, UNSPECIFIED GASTRITIS TYPE: Primary | ICD-10-CM

## 2021-07-14 LAB
ALBUMIN SERPL-MCNC: 3.6 G/DL (ref 3.4–5)
ALP SERPL-CCNC: 66 U/L (ref 40–150)
ALT SERPL W P-5'-P-CCNC: 148 U/L
ANION GAP SERPL CALCULATED.3IONS-SCNC: 7 MMOL/L (ref 3–14)
AST SERPL W P-5'-P-CCNC: 88 U/L (ref 0–45)
BILIRUB SERPL-MCNC: 0.3 MG/DL (ref 0.2–1.3)
BUN SERPL-MCNC: 5 MG/DL (ref 7–30)
CALCIUM SERPL-MCNC: 9.1 MG/DL (ref 8.5–10.1)
CHLORIDE BLD-SCNC: 105 MMOL/L
CO2 SERPL-SCNC: 25 MMOL/L (ref 20–32)
CREAT SERPL-MCNC: 0.88 MG/DL
CRP SERPL-MCNC: <2.9 MG/L (ref 0–8)
ERYTHROCYTE [DISTWIDTH] IN BLOOD BY AUTOMATED COUNT: 16.6 % (ref 10–15)
ERYTHROCYTE [SEDIMENTATION RATE] IN BLOOD BY WESTERGREN METHOD: 5 MM/HR (ref 0–20)
GFR SERPL CREATININE-BSD FRML MDRD: >90 ML/MIN/1.73M2
GLUCOSE BLD-MCNC: 88 MG/DL (ref 70–99)
HCT VFR BLD AUTO: 36.5 % (ref 35–47)
HGB BLD-MCNC: 11.3 G/DL (ref 11.7–15.7)
MCH RBC QN AUTO: 23.6 PG (ref 26.5–33)
MCHC RBC AUTO-ENTMCNC: 31 G/DL (ref 31.5–36.5)
MCV RBC AUTO: 76 FL (ref 78–100)
PLATELET # BLD AUTO: 271 10E3/UL (ref 150–450)
POTASSIUM BLD-SCNC: 4.1 MMOL/L (ref 3.4–5.3)
PROT SERPL-MCNC: 6.8 G/DL (ref 6.8–8.8)
RBC # BLD AUTO: 4.78 10E6/UL (ref 3.8–5.2)
SODIUM SERPL-SCNC: 137 MMOL/L (ref 133–144)
WBC # BLD AUTO: 4.3 10E3/UL (ref 4–11)

## 2021-07-14 PROCEDURE — 99213 OFFICE O/P EST LOW 20 MIN: CPT | Performed by: PHYSICIAN ASSISTANT

## 2021-07-14 PROCEDURE — 85652 RBC SED RATE AUTOMATED: CPT | Performed by: PHYSICIAN ASSISTANT

## 2021-07-14 PROCEDURE — 36415 COLL VENOUS BLD VENIPUNCTURE: CPT | Performed by: PHYSICIAN ASSISTANT

## 2021-07-14 PROCEDURE — 83516 IMMUNOASSAY NONANTIBODY: CPT | Performed by: PHYSICIAN ASSISTANT

## 2021-07-14 PROCEDURE — 85027 COMPLETE CBC AUTOMATED: CPT | Performed by: PHYSICIAN ASSISTANT

## 2021-07-14 PROCEDURE — 80053 COMPREHEN METABOLIC PANEL: CPT | Performed by: PHYSICIAN ASSISTANT

## 2021-07-14 PROCEDURE — 86140 C-REACTIVE PROTEIN: CPT | Performed by: PHYSICIAN ASSISTANT

## 2021-07-14 NOTE — TELEPHONE ENCOUNTER
----- Message from Nafisa Thomas PA-C sent at 7/14/2021  9:01 AM CDT -----  Please call patient and let her know that ESR (inflammatory marker is normal). Hemoglobin is slightly low at 11.3. Levels are showing this is likely iron deficiency. I would recommend starting an iron supplement. It is best taken with orange juice or sometime with vitamin C to help with absorption. Iron can cause constipation and upset stomach. If she would like she can try taking 1/2 tablet OTC every other day to start with. We will see what other testing shows.

## 2021-07-14 NOTE — TELEPHONE ENCOUNTER
Called patient to confirm details surrounding drinking history. Patient states that she usually drinks every weekend. Usually about 5 drinks when she does that consist of either wine or vodka mixed with water or soda.     Patient will call back to schedule labs to be taken place in one month.     Routing to PA to inform.     ADRIANA BustillosN, RN  [facility]

## 2021-07-14 NOTE — TELEPHONE ENCOUNTER
Called patient to inform of message below. Patient states that she has been taking an iron supplement occasionally, but with no regularity. Patient agrees to follow plan outlined by PCP.     ADRIANA BustillosN, RN  [facility]

## 2021-07-14 NOTE — PROGRESS NOTES
Assessment & Plan     Acute gastritis without hemorrhage, unspecified gastritis type  Patient's symptoms are consistent with gastritis. She is having nausea and stomach irritation as well as occasional vomiting with eating most foods. Testing obtained as noted below. She has attempted to change some of the foods she is eating. She has not found any changes that are particularly helpful.  She would like a phone call with lab results when they return.   Consider PPI once H pylori testing has returned.   CBC is consistent with iron deficiency anemia. Will also await other labs but patient can start iron in the mean time.  - CBC with platelets; Future  - Comprehensive metabolic panel (BMP + Alb, Alk Phos, ALT, AST, Total. Bili, TP); Future  - CRP, inflammation; Future  - ESR: Erythrocyte sedimentation rate; Future  - Tissue transglutaminase kishor IgA and IgG; Future  - Tissue transglutaminase kishor IgA and IgG  - ESR: Erythrocyte sedimentation rate  - CRP, inflammation  - Comprehensive metabolic panel (BMP + Alb, Alk Phos, ALT, AST, Total. Bili, TP)  - CBC with platelets  - Helicobacter pylori Antigen Stool; Future    Diarrhea, unspecified type  As noted above.  - CBC with platelets; Future  - Comprehensive metabolic panel (BMP + Alb, Alk Phos, ALT, AST, Total. Bili, TP); Future  - CRP, inflammation; Future  - ESR: Erythrocyte sedimentation rate; Future  - Tissue transglutaminase kishor IgA and IgG; Future  - Tissue transglutaminase kishor IgA and IgG  - ESR: Erythrocyte sedimentation rate  - CRP, inflammation  - Comprehensive metabolic panel (BMP + Alb, Alk Phos, ALT, AST, Total. Bili, TP)  - CBC with platelets  - Helicobacter pylori Antigen Stool; Future    Declines STI testing today.    Review of the result(s) of each unique test - CBC  Ordering of each unique test  21 minutes spent on the date of the encounter doing chart review, history and exam, documentation and further activities per the note       Patient  "Instructions   Once lab results returned can start medication for discomfort (omeprazole).    If not improving consider GI referral.    TAQUERIA [facility] ANABELL Patrick is a 21 year old who presents for the following health issues     HPI     Abdominal/Flank Pain  Onset/Duration: X2 months  Description:   Character: Fullness and \"Sour stomach feeling\"  Location: epigastric  Radiation: None  Intensity: 7 to 8/10  Progression of Symptoms:  worsening  Accompanying Signs & Symptoms:  Fever/Chills: no  Gas/Bloating: YES- epigastric area  Nausea: YES  Vomitting: YES when its really bad  Diarrhea: YES  Constipation: Sometimes  Dysuria or Hematuria: no  History:   Trauma: no  Previous similar pain: no  Previous tests done: none  Precipitating factors:   Does the pain change with:     Food: YES- worse after eating    Bowel Movement: YES- better with bowel movements    Urination: no   Other factors:  Exercising makes it worse (worsens the discomfort in the stomach)  Therapies tried and outcome: Tried tums but does not help     She notes she did wake with symptoms in the middle of the night. She had a \"sour feeling\" in the stomach and diarrhea.    She notes foods that are bland are tolerable.    Denies any increase with minimal alcohol use.    Patient's last menstrual period was 06/14/2021 (approximate).    Review of Systems   GENERAL:  No fevers  GI:  As noted in HPI        Objective    /62 (BP Location: Right arm, Patient Position: Sitting, Cuff Size: Adult Regular)   Pulse 77   Resp 16   Wt 59.9 kg (132 lb)   LMP 06/14/2021 (Approximate)   SpO2 100%   Breastfeeding No   BMI 24.94 kg/m    Body mass index is 24.94 kg/m .  Physical Exam   GENERAL: No acute distress  HEENT: Normocephalic,  CARDIAC: Regular rate and rhythm. No murmurs.  PULMONARY: Lungs are clear to auscultation bilaterally. No wheezes, rhonchi or crackles.  GI: Active bowel sounds, abdomen is " soft and non-tender  : No CVA tenderness bilaterally.   NEURO: Alert and non-focal      Results for orders placed or performed in visit on 07/14/21 (from the past 24 hour(s))   ESR: Erythrocyte sedimentation rate   Result Value Ref Range    Erythrocyte Sedimentation Rate 5 0 - 20 mm/hr   CBC with platelets   Result Value Ref Range    WBC Count 4.3 4.0 - 11.0 10e3/uL    RBC Count 4.78 3.80 - 5.20 10e6/uL    Hemoglobin 11.3 (L) 11.7 - 15.7 g/dL    Hematocrit 36.5 35.0 - 47.0 %    MCV 76 (L) 78 - 100 fL    MCH 23.6 (L) 26.5 - 33.0 pg    MCHC 31.0 (L) 31.5 - 36.5 g/dL    RDW 16.6 (H) 10.0 - 15.0 %    Platelet Count 271 150 - 450 10e3/uL

## 2021-07-14 NOTE — PATIENT INSTRUCTIONS
Once lab results returned can start medication for discomfort (omeprazole).    If not improving consider GI referral.

## 2021-07-14 NOTE — TELEPHONE ENCOUNTER
Please call patient and let her know that her CRP (inflammatory marker) is normal. Electrolytes and kidney function are normal however she does have some elevated liver enzymes. The pattern of enzyme elevation is consistent with alcohol use. How much is she usually drinking when she does drink? I would recommend cutting back on alcohol use.     Repeat labs in 1 month if not improved should do further testing with ultrasound of the liver and further labs.

## 2021-07-15 LAB
TTG IGA SER-ACNC: 44 U/ML
TTG IGG SER-ACNC: 12 U/ML

## 2021-07-30 ENCOUNTER — TRANSFERRED RECORDS (OUTPATIENT)
Dept: HEALTH INFORMATION MANAGEMENT | Facility: CLINIC | Age: 21
End: 2021-07-30

## 2021-09-07 ENCOUNTER — TELEPHONE (OUTPATIENT)
Dept: FAMILY MEDICINE | Facility: CLINIC | Age: 21
End: 2021-09-07

## 2021-09-07 DIAGNOSIS — R10.13 EPIGASTRIC PAIN: Primary | ICD-10-CM

## 2021-09-07 DIAGNOSIS — R19.7 DIARRHEA, UNSPECIFIED TYPE: ICD-10-CM

## 2021-09-07 NOTE — TELEPHONE ENCOUNTER
Pt requesting a Thyroid and Dairy intolerance lab work orders please    Cate Mccoy/ALAN  [place]---[facility]

## 2021-09-09 ENCOUNTER — LAB (OUTPATIENT)
Dept: LAB | Facility: CLINIC | Age: 21
End: 2021-09-09
Payer: COMMERCIAL

## 2021-09-09 DIAGNOSIS — R19.7 DIARRHEA, UNSPECIFIED TYPE: ICD-10-CM

## 2021-09-09 DIAGNOSIS — R10.13 EPIGASTRIC PAIN: ICD-10-CM

## 2021-09-09 LAB — TSH SERPL DL<=0.005 MIU/L-ACNC: 1.57 MU/L (ref 0.4–4)

## 2021-09-09 PROCEDURE — 36415 COLL VENOUS BLD VENIPUNCTURE: CPT

## 2021-09-09 PROCEDURE — 86003 ALLG SPEC IGE CRUDE XTRC EA: CPT

## 2021-09-09 PROCEDURE — 84443 ASSAY THYROID STIM HORMONE: CPT

## 2021-09-10 ENCOUNTER — TELEPHONE (OUTPATIENT)
Dept: FAMILY MEDICINE | Facility: CLINIC | Age: 21
End: 2021-09-10

## 2021-09-10 DIAGNOSIS — R19.7 DIARRHEA, UNSPECIFIED TYPE: ICD-10-CM

## 2021-09-10 DIAGNOSIS — R10.13 EPIGASTRIC PAIN: Primary | ICD-10-CM

## 2021-09-10 LAB — COW MILK IGE QN: 0.21 KU(A)/L

## 2021-09-15 NOTE — TELEPHONE ENCOUNTER
"CMP supposed to be drawn when patient here last week. Still posted as \"future\". Can we determine with lab if still can use blood from last week or if patient needs to be drawn again. If needs to be drawn again please call patient and apologize, get lab only visit scheduled.  "

## 2021-09-17 ENCOUNTER — TELEPHONE (OUTPATIENT)
Dept: FAMILY MEDICINE | Facility: CLINIC | Age: 21
End: 2021-09-17

## 2021-09-17 ENCOUNTER — LAB (OUTPATIENT)
Dept: LAB | Facility: CLINIC | Age: 21
End: 2021-09-17
Payer: COMMERCIAL

## 2021-09-17 ENCOUNTER — MYC MEDICAL ADVICE (OUTPATIENT)
Dept: FAMILY MEDICINE | Facility: CLINIC | Age: 21
End: 2021-09-17

## 2021-09-17 ENCOUNTER — NURSE TRIAGE (OUTPATIENT)
Dept: NURSING | Facility: CLINIC | Age: 21
End: 2021-09-17

## 2021-09-17 DIAGNOSIS — R19.7 DIARRHEA, UNSPECIFIED TYPE: ICD-10-CM

## 2021-09-17 DIAGNOSIS — R74.8 ELEVATED LIVER ENZYMES: ICD-10-CM

## 2021-09-17 DIAGNOSIS — K90.0 CELIAC DISEASE: ICD-10-CM

## 2021-09-17 DIAGNOSIS — F41.9 ANXIETY: ICD-10-CM

## 2021-09-17 DIAGNOSIS — R74.02 NONSPECIFIC ELEVATION OF LEVELS OF TRANSAMINASE OR LACTIC ACID DEHYDROGENASE (LDH): Primary | ICD-10-CM

## 2021-09-17 DIAGNOSIS — F32.A DEPRESSION, UNSPECIFIED DEPRESSION TYPE: ICD-10-CM

## 2021-09-17 DIAGNOSIS — R74.01 NONSPECIFIC ELEVATION OF LEVELS OF TRANSAMINASE OR LACTIC ACID DEHYDROGENASE (LDH): Primary | ICD-10-CM

## 2021-09-17 DIAGNOSIS — Z91.011 MILK ALLERGY: ICD-10-CM

## 2021-09-17 DIAGNOSIS — R10.13 EPIGASTRIC PAIN: ICD-10-CM

## 2021-09-17 LAB
ALBUMIN SERPL-MCNC: 3.8 G/DL (ref 3.4–5)
ALP SERPL-CCNC: 93 U/L (ref 40–150)
ALT SERPL W P-5'-P-CCNC: 487 U/L (ref 0–50)
ANION GAP SERPL CALCULATED.3IONS-SCNC: 5 MMOL/L (ref 3–14)
AST SERPL W P-5'-P-CCNC: 572 U/L (ref 0–45)
BILIRUB SERPL-MCNC: 0.8 MG/DL (ref 0.2–1.3)
BUN SERPL-MCNC: 5 MG/DL (ref 7–30)
CALCIUM SERPL-MCNC: 9 MG/DL (ref 8.5–10.1)
CHLORIDE BLD-SCNC: 109 MMOL/L (ref 94–109)
CO2 SERPL-SCNC: 26 MMOL/L (ref 20–32)
CREAT SERPL-MCNC: 0.79 MG/DL (ref 0.52–1.04)
GFR SERPL CREATININE-BSD FRML MDRD: >90 ML/MIN/1.73M2
GLUCOSE BLD-MCNC: 82 MG/DL (ref 70–99)
POTASSIUM BLD-SCNC: 4.9 MMOL/L (ref 3.4–5.3)
PROT SERPL-MCNC: 6.9 G/DL (ref 6.8–8.8)
SODIUM SERPL-SCNC: 140 MMOL/L (ref 133–144)

## 2021-09-17 PROCEDURE — 36415 COLL VENOUS BLD VENIPUNCTURE: CPT

## 2021-09-17 PROCEDURE — 80053 COMPREHEN METABOLIC PANEL: CPT

## 2021-09-18 NOTE — TELEPHONE ENCOUNTER
[facility] lab reports critical lab drawn today at 7:22am:  .  Previous AST on 7/14 was elevated at 88. Pt seeing provider for 2 months for epigastric pain. In July this year celiac disease was suspected. Sent for UGI w/ bx.  Paged on-call Dr Murillo @7:34pm in Smart Web. 2nd page sent @7:48pm via [facility] [employer] page . Dr Murillo called back @8:01pm. Critical lab reported to Dr Murillo.     Reason for Disposition    Lab or radiology calling with CRITICAL test results    Additional Information    Negative: Lab calling with strep throat test results and triager can call in prescription    Negative: Lab calling with urinalysis test results and triager can call in prescription    Negative: Medication questions    Negative: Call about patient who is currently hospitalized    Negative: Physician call to PCP    Negative: ED call to PCP    Protocols used: [facility]

## 2021-09-18 NOTE — TELEPHONE ENCOUNTER
Elevated transaminase  Rising  (0-45)  Needs evaluation  Next week OK  CT abd o  1st, ordered  appt thereafter  Attempted phone call: disconnected  [employer] message sent  Arrange scan, appt thereafter  Ramon Murillo MD

## 2021-09-19 ENCOUNTER — HEALTH MAINTENANCE LETTER (OUTPATIENT)
Age: 21
End: 2021-09-19

## 2021-09-20 ENCOUNTER — HOSPITAL ENCOUNTER (OUTPATIENT)
Dept: MRI IMAGING | Facility: CLINIC | Age: 21
End: 2021-09-20
Attending: NURSE PRACTITIONER
Payer: COMMERCIAL

## 2021-09-20 ENCOUNTER — VIRTUAL VISIT (OUTPATIENT)
Dept: FAMILY MEDICINE | Facility: CLINIC | Age: 21
End: 2021-09-20
Payer: COMMERCIAL

## 2021-09-20 ENCOUNTER — HOSPITAL ENCOUNTER (OUTPATIENT)
Dept: CT IMAGING | Facility: CLINIC | Age: 21
End: 2021-09-20
Attending: NURSE PRACTITIONER
Payer: COMMERCIAL

## 2021-09-20 ENCOUNTER — TELEPHONE (OUTPATIENT)
Dept: FAMILY MEDICINE | Facility: CLINIC | Age: 21
End: 2021-09-20

## 2021-09-20 DIAGNOSIS — R74.8 ELEVATED LIVER ENZYMES: ICD-10-CM

## 2021-09-20 DIAGNOSIS — R74.01 NONSPECIFIC ELEVATION OF LEVELS OF TRANSAMINASE OR LACTIC ACID DEHYDROGENASE (LDH): Primary | ICD-10-CM

## 2021-09-20 DIAGNOSIS — R74.02 NONSPECIFIC ELEVATION OF LEVELS OF TRANSAMINASE OR LACTIC ACID DEHYDROGENASE (LDH): Primary | ICD-10-CM

## 2021-09-20 DIAGNOSIS — R93.2 ABNORMAL LIVER CT: Primary | ICD-10-CM

## 2021-09-20 DIAGNOSIS — R74.02 NONSPECIFIC ELEVATION OF LEVELS OF TRANSAMINASE OR LACTIC ACID DEHYDROGENASE (LDH): ICD-10-CM

## 2021-09-20 DIAGNOSIS — R74.01 NONSPECIFIC ELEVATION OF LEVELS OF TRANSAMINASE OR LACTIC ACID DEHYDROGENASE (LDH): ICD-10-CM

## 2021-09-20 DIAGNOSIS — Z91.011 MILK ALLERGY: ICD-10-CM

## 2021-09-20 DIAGNOSIS — R93.2 ABNORMAL LIVER CT: ICD-10-CM

## 2021-09-20 DIAGNOSIS — K90.0 CELIAC DISEASE: ICD-10-CM

## 2021-09-20 LAB
APAP SERPL-MCNC: <2 MG/L (ref 10–30)
HAV IGM SERPL QL IA: NONREACTIVE
HBV CORE AB SERPL QL IA: NONREACTIVE
HBV SURFACE AB SERPL IA-ACNC: 0 M[IU]/ML
HBV SURFACE AG SERPL QL IA: NONREACTIVE
HCG SERPL QL: NEGATIVE
HCV AB SERPL QL IA: NONREACTIVE

## 2021-09-20 PROCEDURE — 80143 DRUG ASSAY ACETAMINOPHEN: CPT

## 2021-09-20 PROCEDURE — 82040 ASSAY OF SERUM ALBUMIN: CPT

## 2021-09-20 PROCEDURE — 255N000002 HC RX 255 OP 636: Performed by: RADIOLOGY

## 2021-09-20 PROCEDURE — 74183 MRI ABD W/O CNTR FLWD CNTR: CPT

## 2021-09-20 PROCEDURE — 87340 HEPATITIS B SURFACE AG IA: CPT

## 2021-09-20 PROCEDURE — 86706 HEP B SURFACE ANTIBODY: CPT

## 2021-09-20 PROCEDURE — 86803 HEPATITIS C AB TEST: CPT

## 2021-09-20 PROCEDURE — 86704 HEP B CORE ANTIBODY TOTAL: CPT

## 2021-09-20 PROCEDURE — 36415 COLL VENOUS BLD VENIPUNCTURE: CPT

## 2021-09-20 PROCEDURE — 86038 ANTINUCLEAR ANTIBODIES: CPT

## 2021-09-20 PROCEDURE — 99214 OFFICE O/P EST MOD 30 MIN: CPT | Mod: 95 | Performed by: NURSE PRACTITIONER

## 2021-09-20 PROCEDURE — 250N000009 HC RX 250: Performed by: NURSE PRACTITIONER

## 2021-09-20 PROCEDURE — 86709 HEPATITIS A IGM ANTIBODY: CPT

## 2021-09-20 PROCEDURE — 74177 CT ABD & PELVIS W/CONTRAST: CPT

## 2021-09-20 PROCEDURE — A9585 GADOBUTROL INJECTION: HCPCS | Performed by: RADIOLOGY

## 2021-09-20 PROCEDURE — 250N000011 HC RX IP 250 OP 636: Performed by: NURSE PRACTITIONER

## 2021-09-20 PROCEDURE — 84703 CHORIONIC GONADOTROPIN ASSAY: CPT

## 2021-09-20 RX ORDER — GADOBUTROL 604.72 MG/ML
7.5 INJECTION INTRAVENOUS ONCE
Status: COMPLETED | OUTPATIENT
Start: 2021-09-20 | End: 2021-09-20

## 2021-09-20 RX ORDER — IOPAMIDOL 755 MG/ML
500 INJECTION, SOLUTION INTRAVASCULAR ONCE
Status: COMPLETED | OUTPATIENT
Start: 2021-09-20 | End: 2021-09-20

## 2021-09-20 RX ADMIN — GADOBUTROL 6 ML: 604.72 INJECTION INTRAVENOUS at 13:52

## 2021-09-20 RX ADMIN — IOPAMIDOL 66 ML: 755 INJECTION, SOLUTION INTRAVENOUS at 10:57

## 2021-09-20 RX ADMIN — SODIUM CHLORIDE 57 ML: 9 INJECTION, SOLUTION INTRAVENOUS at 10:57

## 2021-09-20 NOTE — TELEPHONE ENCOUNTER
MRI ordered and GI referral updated per CT results.   Labs pending.   VANESA Valerio CNP on 9/20/2021 at 12:44 PM'

## 2021-09-20 NOTE — TELEPHONE ENCOUNTER
PAL RN spoke w/pt who verified pt's mom, Zuri (MA @ [facility]) assisting pt w/scheduling of CT Scan which pt is having today, 9/20/2021 @ [facility] @ 10:00 AM for elevated labs of  U/L (0-45 U/L) &  U/L (0-50 U/L).  Pt has a F/U evaluation w/Nafisa Thomas PA-C on Tue 9/21/2021 @ 2:00 PM to review labs and CT Scan results.       Pt verbalized understanding of CT Scan appointment and F/U appointment time and has PAL RN number to call with an questions or concerns.     Ester Cox, Registered Nurse, PAL (Patient Advocate Liaison)   [facility]     [phone]

## 2021-09-20 NOTE — PROGRESS NOTES
Celina is a 21 year old who is being evaluated via a billable telephone visit.      What phone number would you like to be contacted at?   How would you like to obtain your AVS? MyChart    Assessment & Plan     Abnormal liver CT  Elevated liver enzymes  MRI with fibrosis of unknown etiology, neoplasm unlikely. Thorough discussion of cessation of alcohol, supplement, and diet.   Hepatitis and autoimmune labs pending. Is immunized for hepatitis A and B.   GI referral updated.   Discussed red flags and need for urgent follow-up.     Celiac disease  Eliminating Gluten from diet.     Milk allergy  Discussed Whey protein.                    Return in about 1 week (around 9/27/2021) for GI .    VAENSA Valerio CNP  [facility]   Celina is a 21 year old who presents for the following health issues  accompanied by her mother:    HPI     To discuss MRI visit from 9/20/2021 resulting from recommendations per CT.   Patient remains asymptomatic.   Recent diagnosis of celiac disease.   Vegetarian diet with Whey protein intake.   Social alcohol use with recent use on vacation.   No tylenol use.   As needed iron supplementation 2/2 vegetarian diet.   Probiotic use as needed.       Review of Systems   Constitutional, HEENT, cardiovascular, pulmonary, gi and gu systems are negative, except as otherwise noted.      Objective           Vitals:  No vitals were obtained today due to virtual visit.    Physical Exam   healthy, alert and no distress  PSYCH: Alert and oriented times 3; coherent speech, normal   rate and volume, able to articulate logical thoughts, able   to abstract reason, no tangential thoughts, no hallucinations   or delusions  Her affect is normal  RESP: No cough, no audible wheezing, able to talk in full sentences  Remainder of exam unable to be completed due to telephone visits    9/20/2021 Hepatic MRI  IMPRESSION:   1. There are scattered irregular areas of T2 hyperintensity  and T1  hypointensity, most prominent in the left hepatic lobe. The left  hepatic lobe also appears mildly atrophic. Findings suggest areas of  hepatic fibrosis, of uncertain etiology. Hepatic neoplasm is  considered unlikely from this appearance, however clinical correlation  and follow-up are recommended.  2. No biliary dilatation is identified.  3. Moderate amount of stool throughout the visualized portion of the  colon.        Phone call duration: 15 minutes

## 2021-09-20 NOTE — TELEPHONE ENCOUNTER
PCP request to change CT to STAT, PCP not on site. Patient request to have order placed now.   VANESA Valerio CNP on 9/20/2021 at 7:45 AM

## 2021-09-20 NOTE — TELEPHONE ENCOUNTER
Nafisa Thomas PA-C,     Pt was to have referrals to:    1) Behavioral health     2) Nutrition    Are you still interested in these referrals for pt, or are these pending CT scan results?    Kindly advise.    Ester Cox, Registered Nurse, PAL (Patient Advocate Liaison)   [facility]     [phone]

## 2021-09-21 LAB
ALBUMIN SERPL-MCNC: 3.8 G/DL (ref 3.4–5)
ALP SERPL-CCNC: 115 U/L (ref 40–150)
ALT SERPL W P-5'-P-CCNC: 588 U/L (ref 0–50)
ANA SER QL IF: NEGATIVE
ANION GAP SERPL CALCULATED.3IONS-SCNC: 5 MMOL/L (ref 3–14)
AST SERPL W P-5'-P-CCNC: 493 U/L (ref 0–45)
BILIRUB SERPL-MCNC: 0.6 MG/DL (ref 0.2–1.3)
BUN SERPL-MCNC: 4 MG/DL (ref 7–30)
CALCIUM SERPL-MCNC: 8.4 MG/DL (ref 8.5–10.1)
CHLORIDE BLD-SCNC: 105 MMOL/L (ref 94–109)
CO2 SERPL-SCNC: 28 MMOL/L (ref 20–32)
CREAT SERPL-MCNC: 0.61 MG/DL (ref 0.52–1.04)
GFR SERPL CREATININE-BSD FRML MDRD: >90 ML/MIN/1.73M2
GLUCOSE BLD-MCNC: 104 MG/DL (ref 70–99)
POTASSIUM BLD-SCNC: 4.3 MMOL/L (ref 3.4–5.3)
PROT SERPL-MCNC: 6.5 G/DL (ref 6.8–8.8)
SODIUM SERPL-SCNC: 138 MMOL/L (ref 133–144)

## 2021-09-27 ENCOUNTER — TRANSFERRED RECORDS (OUTPATIENT)
Dept: HEALTH INFORMATION MANAGEMENT | Facility: CLINIC | Age: 21
End: 2021-09-27

## 2021-09-27 LAB
ALT SERPL-CCNC: 444 IU/L (ref 0–32)
AST SERPL-CCNC: 335 IU/L (ref 0–40)
CREATININE (EXTERNAL): 0.81 MG/DL (ref 0.57–1)
GFR ESTIMATED (EXTERNAL): 103 ML/MIN/1.73M2
GFR ESTIMATED (IF AFRICAN AMERICAN) (EXTERNAL): 119 ML/MIN/1.73M2
GLUCOSE (EXTERNAL): 96 MG/DL (ref 65–99)
INR (EXTERNAL): 1.1 (ref 0.9–1.2)
POTASSIUM (EXTERNAL): 4.3 MMOL/L (ref 3.5–5.2)

## 2021-09-28 ENCOUNTER — TELEPHONE (OUTPATIENT)
Dept: FAMILY MEDICINE | Facility: CLINIC | Age: 21
End: 2021-09-28

## 2021-09-28 NOTE — TELEPHONE ENCOUNTER
[facility] called back appt set up for in person visit     Friday October 29th @ 1:10 pm   Dr. Jeffery   [address]   [place]   [address]     Paperwork will be mailed to home address     Letter provided to mother with appointment information     Nancy Echols Registered Nurse, PAL (Patient Advocate Liason)   [facility]   [phone]    No

## 2021-09-28 NOTE — TELEPHONE ENCOUNTER
RN spoke to patient's mother Zuri     Patient was seen at [facility] 9/27/2021 for elevated liver enzymes and advised to be seen in 4-6 weeks for follow up and the appt they were able to get is December 6th which is after recommended time     RN placed call to [facility] - to inquire if we can get patient in sooner, message was sent to the patient coordinator to return call directly to this RN     Nancy Echols, Registered Nurse, PAL (Patient Advocate Liason)   [facility]   [phone]

## 2021-09-28 NOTE — LETTER
September 28, 2021      Celina Fam  [address] [address] [id number]            Dear Celina,      An appointment has been scheduled with [facility]     Friday October 29th @ 1:10 pm   Dr. Jeffery   [address]   [place]   [address]       Sincerely,    Nancy Echols, Registered Nurse  [facility]

## 2021-10-01 ENCOUNTER — MEDICAL CORRESPONDENCE (OUTPATIENT)
Dept: HEALTH INFORMATION MANAGEMENT | Facility: CLINIC | Age: 21
End: 2021-10-01

## 2021-10-06 DIAGNOSIS — E50.9 VITAMIN A DEFICIENCY: Primary | ICD-10-CM

## 2021-10-07 NOTE — TELEPHONE ENCOUNTER
RN called RAYNA 10/7/2021 to ensure this appointment is still scheduled, mother was advised by [facility] staff that they didn't see this appt scheduled     RN spoke to scheduling who verified the appointment is scheduled as below   Mother Zuri notified     Nancy Echols, Registered Nurse  [facility]

## 2021-10-08 ENCOUNTER — TELEPHONE (OUTPATIENT)
Dept: INTERVENTIONAL RADIOLOGY/VASCULAR | Facility: CLINIC | Age: 21
End: 2021-10-08

## 2021-10-08 NOTE — TELEPHONE ENCOUNTER
Patient is approved for US guided random liver biopsy by Dr. Butler.  This is a sedation procedure.  History and physical need to be done.  Patient does need INR and platelet labs.  Patient is NOT on blood thinners or aspirin.  Patient will need a COVID test to be coordinated by centralized scheduling.

## 2021-10-11 ENCOUNTER — TELEPHONE (OUTPATIENT)
Dept: FAMILY MEDICINE | Facility: CLINIC | Age: 21
End: 2021-10-11

## 2021-10-11 DIAGNOSIS — Z01.812 ENCOUNTER FOR PREOPERATIVE SCREENING LABORATORY TESTING FOR COVID-19 VIRUS: Primary | ICD-10-CM

## 2021-10-11 DIAGNOSIS — Z11.52 ENCOUNTER FOR PREOPERATIVE SCREENING LABORATORY TESTING FOR COVID-19 VIRUS: Primary | ICD-10-CM

## 2021-10-11 RX ORDER — FENTANYL CITRATE 50 UG/ML
25-50 INJECTION, SOLUTION INTRAMUSCULAR; INTRAVENOUS EVERY 5 MIN PRN
Status: CANCELLED | OUTPATIENT
Start: 2021-10-11

## 2021-10-11 RX ORDER — FLUMAZENIL 0.1 MG/ML
0.2 INJECTION, SOLUTION INTRAVENOUS
Status: CANCELLED | OUTPATIENT
Start: 2021-10-11

## 2021-10-11 RX ORDER — NALOXONE HYDROCHLORIDE 0.4 MG/ML
0.2 INJECTION, SOLUTION INTRAMUSCULAR; INTRAVENOUS; SUBCUTANEOUS
Status: CANCELLED | OUTPATIENT
Start: 2021-10-11

## 2021-10-11 RX ORDER — NALOXONE HYDROCHLORIDE 0.4 MG/ML
0.4 INJECTION, SOLUTION INTRAMUSCULAR; INTRAVENOUS; SUBCUTANEOUS
Status: CANCELLED | OUTPATIENT
Start: 2021-10-11

## 2021-10-11 RX ORDER — LIDOCAINE 40 MG/G
CREAM TOPICAL
Status: CANCELLED | OUTPATIENT
Start: 2021-10-11

## 2021-10-11 NOTE — TELEPHONE ENCOUNTER
COVID-19 testing orders placed for upcoming procedure.   VANESA Valerio CNP on 10/11/2021 at 12:57 PM

## 2021-10-12 ENCOUNTER — LAB (OUTPATIENT)
Dept: LAB | Facility: CLINIC | Age: 21
End: 2021-10-12
Payer: COMMERCIAL

## 2021-10-12 DIAGNOSIS — Z11.52 ENCOUNTER FOR PREOPERATIVE SCREENING LABORATORY TESTING FOR COVID-19 VIRUS: ICD-10-CM

## 2021-10-12 DIAGNOSIS — Z01.812 ENCOUNTER FOR PREOPERATIVE SCREENING LABORATORY TESTING FOR COVID-19 VIRUS: ICD-10-CM

## 2021-10-12 PROCEDURE — U0005 INFEC AGEN DETEC AMPLI PROBE: HCPCS

## 2021-10-12 PROCEDURE — U0003 INFECTIOUS AGENT DETECTION BY NUCLEIC ACID (DNA OR RNA); SEVERE ACUTE RESPIRATORY SYNDROME CORONAVIRUS 2 (SARS-COV-2) (CORONAVIRUS DISEASE [COVID-19]), AMPLIFIED PROBE TECHNIQUE, MAKING USE OF HIGH THROUGHPUT TECHNOLOGIES AS DESCRIBED BY CMS-2020-01-R: HCPCS

## 2021-10-13 ENCOUNTER — TELEPHONE (OUTPATIENT)
Dept: MEDSURG UNIT | Facility: CLINIC | Age: 21
End: 2021-10-13

## 2021-10-13 LAB — SARS-COV-2 RNA RESP QL NAA+PROBE: NEGATIVE

## 2021-10-14 ENCOUNTER — TELEPHONE (OUTPATIENT)
Dept: FAMILY MEDICINE | Facility: CLINIC | Age: 21
End: 2021-10-14

## 2021-10-14 ENCOUNTER — HOSPITAL ENCOUNTER (OUTPATIENT)
Facility: CLINIC | Age: 21
Discharge: HOME | End: 2021-10-14
Admitting: RADIOLOGY
Payer: COMMERCIAL

## 2021-10-14 ENCOUNTER — HOSPITAL ENCOUNTER (OUTPATIENT)
Dept: ULTRASOUND IMAGING | Facility: CLINIC | Age: 21
End: 2021-10-14
Attending: PHYSICIAN ASSISTANT
Payer: COMMERCIAL

## 2021-10-14 VITALS
OXYGEN SATURATION: 97 % | HEART RATE: 86 BPM | RESPIRATION RATE: 16 BRPM | TEMPERATURE: 97.8 F | DIASTOLIC BLOOD PRESSURE: 74 MMHG | SYSTOLIC BLOOD PRESSURE: 123 MMHG

## 2021-10-14 DIAGNOSIS — R79.89 ELEVATED LIVER FUNCTION TESTS: Primary | ICD-10-CM

## 2021-10-14 DIAGNOSIS — R79.89 ELEVATED LIVER FUNCTION TESTS: ICD-10-CM

## 2021-10-14 DIAGNOSIS — R74.8 ELEVATED LIVER ENZYMES: ICD-10-CM

## 2021-10-14 DIAGNOSIS — R74.8 ELEVATED LIVER ENZYMES: Primary | ICD-10-CM

## 2021-10-14 LAB
ALBUMIN SERPL-MCNC: 3.7 G/DL (ref 3.4–5)
ALP SERPL-CCNC: 73 U/L (ref 40–150)
ALT SERPL W P-5'-P-CCNC: 86 U/L (ref 0–50)
ANION GAP SERPL CALCULATED.3IONS-SCNC: 8 MMOL/L (ref 3–14)
AST SERPL W P-5'-P-CCNC: 63 U/L (ref 0–45)
BILIRUB SERPL-MCNC: 0.8 MG/DL (ref 0.2–1.3)
BUN SERPL-MCNC: 8 MG/DL (ref 7–30)
CALCIUM SERPL-MCNC: 8.7 MG/DL (ref 8.5–10.1)
CHLORIDE BLD-SCNC: 109 MMOL/L (ref 94–109)
CO2 SERPL-SCNC: 23 MMOL/L (ref 20–32)
CREAT SERPL-MCNC: 0.75 MG/DL (ref 0.52–1.04)
GFR SERPL CREATININE-BSD FRML MDRD: >90 ML/MIN/1.73M2
GLUCOSE BLD-MCNC: 75 MG/DL (ref 70–99)
INR PPP: 1.04 (ref 0.85–1.15)
PLATELET # BLD AUTO: 219 10E3/UL (ref 150–450)
POTASSIUM BLD-SCNC: 3.9 MMOL/L (ref 3.4–5.3)
PROT SERPL-MCNC: 6.9 G/DL (ref 6.8–8.8)
SODIUM SERPL-SCNC: 140 MMOL/L (ref 133–144)

## 2021-10-14 PROCEDURE — 85049 AUTOMATED PLATELET COUNT: CPT | Performed by: PHYSICIAN ASSISTANT

## 2021-10-14 PROCEDURE — 999N000154 HC STATISTIC RADIOLOGY XRAY, US, CT, MAR, NM

## 2021-10-14 PROCEDURE — 88307 TISSUE EXAM BY PATHOLOGIST: CPT | Mod: TC | Performed by: PHYSICIAN ASSISTANT

## 2021-10-14 PROCEDURE — 272N000431 US BIOPSY LIVER

## 2021-10-14 PROCEDURE — 36415 COLL VENOUS BLD VENIPUNCTURE: CPT | Performed by: PHYSICIAN ASSISTANT

## 2021-10-14 PROCEDURE — 36415 COLL VENOUS BLD VENIPUNCTURE: CPT

## 2021-10-14 PROCEDURE — 250N000011 HC RX IP 250 OP 636: Performed by: PHYSICIAN ASSISTANT

## 2021-10-14 PROCEDURE — 250N000013 HC RX MED GY IP 250 OP 250 PS 637: Performed by: RADIOLOGY

## 2021-10-14 PROCEDURE — 82040 ASSAY OF SERUM ALBUMIN: CPT

## 2021-10-14 PROCEDURE — 85610 PROTHROMBIN TIME: CPT | Performed by: PHYSICIAN ASSISTANT

## 2021-10-14 PROCEDURE — 250N000009 HC RX 250: Performed by: RADIOLOGY

## 2021-10-14 RX ORDER — NALOXONE HYDROCHLORIDE 0.4 MG/ML
0.2 INJECTION, SOLUTION INTRAMUSCULAR; INTRAVENOUS; SUBCUTANEOUS
Status: DISCONTINUED | OUTPATIENT
Start: 2021-10-14 | End: 2021-10-14 | Stop reason: HOSPADM

## 2021-10-14 RX ORDER — NALOXONE HYDROCHLORIDE 0.4 MG/ML
0.4 INJECTION, SOLUTION INTRAMUSCULAR; INTRAVENOUS; SUBCUTANEOUS
Status: DISCONTINUED | OUTPATIENT
Start: 2021-10-14 | End: 2021-10-14 | Stop reason: HOSPADM

## 2021-10-14 RX ORDER — LIDOCAINE HYDROCHLORIDE 10 MG/ML
10 INJECTION, SOLUTION EPIDURAL; INFILTRATION; INTRACAUDAL; PERINEURAL ONCE
Status: COMPLETED | OUTPATIENT
Start: 2021-10-14 | End: 2021-10-14

## 2021-10-14 RX ORDER — FLUMAZENIL 0.1 MG/ML
0.2 INJECTION, SOLUTION INTRAVENOUS
Status: DISCONTINUED | OUTPATIENT
Start: 2021-10-14 | End: 2021-10-14 | Stop reason: HOSPADM

## 2021-10-14 RX ORDER — LIDOCAINE 40 MG/G
CREAM TOPICAL
Status: DISCONTINUED | OUTPATIENT
Start: 2021-10-14 | End: 2021-10-14 | Stop reason: HOSPADM

## 2021-10-14 RX ORDER — ACETAMINOPHEN 325 MG/1
650 TABLET ORAL ONCE
Status: COMPLETED | OUTPATIENT
Start: 2021-10-14 | End: 2021-10-14

## 2021-10-14 RX ORDER — FENTANYL CITRATE 50 UG/ML
25-50 INJECTION, SOLUTION INTRAMUSCULAR; INTRAVENOUS EVERY 5 MIN PRN
Status: DISCONTINUED | OUTPATIENT
Start: 2021-10-14 | End: 2021-10-14 | Stop reason: HOSPADM

## 2021-10-14 RX ADMIN — FENTANYL CITRATE 50 MCG: 50 INJECTION, SOLUTION INTRAMUSCULAR; INTRAVENOUS at 13:06

## 2021-10-14 RX ADMIN — ACETAMINOPHEN 650 MG: 325 TABLET, FILM COATED ORAL at 13:43

## 2021-10-14 RX ADMIN — MIDAZOLAM HYDROCHLORIDE 1 MG: 1 INJECTION, SOLUTION INTRAMUSCULAR; INTRAVENOUS at 13:06

## 2021-10-14 RX ADMIN — LIDOCAINE HYDROCHLORIDE 10 ML: 10 INJECTION, SOLUTION EPIDURAL; INFILTRATION; INTRACAUDAL; PERINEURAL at 13:06

## 2021-10-14 NOTE — PROGRESS NOTES
Sedation Post Procedure Summary:    Immediately prior to starting the procedure a Time Out was conducted with procedural staff and re-confirmed the patient s name, procedure, and site/side. Md completed sedation assessment.     Consent obtained from patient after discussing the risks, benefits and alternatives.       Indication:  Sedation was required to allow for liver biopsy    Sedatives: Fentanyl 50 Mcg and Versed 1 Mg    Vital signs, airway, and pulse oximetry were monitored throughout the procedure and sedation was maintained until the procedure was complete.      Patient tolerance: Patient tolerated the procedure well with no immediate complications. Site dressed by tech with band aid at completion. No bleeding or drainage noted.    Post-procedure report given to: Whit PEÑA and pt transferred to  7by cart.    (See Doc Flow-sheets and MAR for additional information)    Ricardo Mobley RN

## 2021-10-14 NOTE — PROGRESS NOTES
Care Suites Post Procedure Note    Patient Information  Name: Celina Fam  Age: 21 year old    Post Procedure  Time patient returned to Care Suites: 125  Concerns/abnormal assessment: R shoulder pain  If abnormal assessment, provider notified: Yes  Plan/Other: tylenol for pain, monitor, site CDI    Whit Bedoya RN

## 2021-10-14 NOTE — PROVIDER NOTIFICATION
Homer PIKE web paged. ? Order for new pregnancy test prior to US liver bx. Neg test on 9/20/21.  Ok per Homer to not recheck pregnancy test. Pt denies pregnancy

## 2021-10-14 NOTE — PRE-PROCEDURE
GENERAL PRE-PROCEDURE:   Procedure:  Liver biopsy  Date/Time:  10/14/2021 12:31 PM    Written consent obtained?: Yes    Risks and benefits: Risks, benefits and alternatives were discussed    Consent given by:  Patient  Patient states understanding of procedure being performed: Yes    Patient's understanding of procedure matches consent: Yes    Procedure consent matches procedure scheduled: Yes    Expected level of sedation:  Moderate  Appropriately NPO:  Yes  Mallampati  :  Grade 1- soft palate, uvula, tonsillar pillars, and posterior pharyngeal wall visible  Lungs:  Lungs clear with good breath sounds bilaterally  Heart:  Normal heart sounds and rate  History & Physical reviewed:  History and physical reviewed and no updates needed  Statement of review:  I have reviewed the lab findings, diagnostic data, medications, and the plan for sedation

## 2021-10-14 NOTE — PROVIDER NOTIFICATION
Dr Orourke notified of 5/10 R should pain. Tylenol given per order x 1. Will notify if pain increases

## 2021-10-14 NOTE — PROGRESS NOTES
Care Suites Admission Nursing Note    Patient Information  Name: Celina Fam  Age: 21 year old  Reason for admission: US Liver Biopsy  Care Suites arrival time: 11:23 am    Visitor Information  Name: staci Keating  Informed of visitor restrictions: Yes  1 visitor allowed per patient   Visitor must screen negative for COVID symptoms   Visitor must wear a mask      Patient Admission/Assessment   Pre-procedure assessment complete: Yes  If abnormal assessment/labs, provider notified: N/A  NPO: Yes  Medications held per instructions/orders: Yes  Consent: deferred  If applicable, pregnancy test status: declined  Patient oriented to room: Yes  Education/questions answered: Yes  Plan/other: To  for liver biopsy at 12:45    Discharge Planning  Discharge name/phone number: Zuri [phone]  Overnight post sedation caregiver: Zuri  Discharge location: home    PATIENT/VISITOR WELLNESS SCREENING    Step 1 Patient Screening    1. In the last month, have you been in contact with someone who was confirmed or suspected to have Coronavirus/COVID-19? No    2. Do you have the following symptoms?  Fever/Chills? No   Cough? No   Shortness of breath? No   New loss of taste or smell? No  Sore throat? No  Muscle or body aches? No  Headaches? No  Fatigue? No  Vomiting or diarrhea? No    Step 2 Visitor Screening    1. Name of Visitor (1 visitor per patient): Zuri    2. In the last month, have you been in contact with someone who was confirmed or suspected to have Coronavirus/COVID-19? No    3. Do you have the following symptoms?  Fever/Chills? No   Cough? No   Shortness of breath? No   Skin rash? No   Loss of taste or smell? No  Sore throat? No  Runny or stuffy nose? No  Muscle or body aches? No  Headaches? No  Fatigue? No  Vomiting or diarrhea? No      Step 3 Refer to logic grid below for actions    NO SYMPTOM(S)    ACTIONS:  1. Standard rooming process  2. Provider to assess per normal protocol  3. Implement precautions as needed  and per guidelines

## 2021-10-14 NOTE — DISCHARGE INSTRUCTIONS
Liver Biopsy Discharge Instructions     After you go home:      You may resume your normal diet    Have an adult stay with you for 6 hours if you received sedation       For 24 hours - due to the sedation you received:    Relax and take it easy    Do NOT make any important or legal decisions    Do NOT drive or operate machines at home or at work    Do NOT drink alcohol    Care of Puncture Site:      For the first 48 hrs, check your puncture site every couple hours while you are awake     You may remove/change the bandaid tomorrow    You may shower tomorrow    No tub baths, whirlpools or swimming until your puncture site has fully healed    Activity       You may go back to normal activity in 24 hours     Wait 48 hours before lifting, straining, exercise or other strenuous activity    Medicines:      You may resume all medications    For minor pain, you may take Acetaminophen (Tylenol) or Ibuprofen (Advil)            Call the provider who ordered this test if:      Increased pain or a large or growing hard lump around the site    Blood or fluid is draining from the site    The site is red, swollen, hot or tender    Chills or a fever greater than 101 F (38 C)    Pain that is getting worse    Any questions or concerns    Call  911 or go to the Emergency Room if:      Severe pain or trouble breathing    Bleeding that you cannot control        If you have questions call:          [facility] Radiology Dept @ [phone]      The provider who performed your procedure was Dr. Orourke.

## 2021-10-14 NOTE — PROGRESS NOTES
Care Suites Discharge Nursing Note    Patient Information  Name: Celina Fam  Age: 21 year old    Discharge Education:  Discharge instructions reviewed: Yes  Additional education/resources provided: na  Patient/patient representative verbalizes understanding: Yes  Patient discharging on new medications: No  Medication education completed: Yes    Discharge Plans:   Discharge location: home  Discharge ride contacted: Yes  Approximate discharge time: 1500    Discharge Criteria:  Discharge criteria met and vital signs stable: Yes    Patient Belongs:  Patient belongings returned to patient: Yes    Whit Bedoya RN

## 2021-10-15 LAB
PATH REPORT.COMMENTS IMP SPEC: NORMAL
PATH REPORT.FINAL DX SPEC: NORMAL
PATH REPORT.GROSS SPEC: NORMAL
PATH REPORT.MICROSCOPIC SPEC OTHER STN: NORMAL
PATH REPORT.RELEVANT HX SPEC: NORMAL
PHOTO IMAGE: NORMAL

## 2021-10-15 PROCEDURE — 88307 TISSUE EXAM BY PATHOLOGIST: CPT | Mod: 26 | Performed by: PATHOLOGY

## 2021-10-15 PROCEDURE — 88313 SPECIAL STAINS GROUP 2: CPT | Mod: 26 | Performed by: PATHOLOGY

## 2021-11-02 ENCOUNTER — TELEPHONE (OUTPATIENT)
Dept: GASTROENTEROLOGY | Facility: CLINIC | Age: 21
End: 2021-11-02

## 2021-11-02 NOTE — TELEPHONE ENCOUNTER
LVM for pt stating that 11/5 phone visit with Miri Butler RD, should be rescheduled to instead be with Birgit Fong RD, through [facility] (better suited for pt's milk allergy and celiac disease). Cancel visit with Miri and adenike next available with Birgit.  Sent mychart.

## 2021-11-08 ENCOUNTER — VIRTUAL VISIT (OUTPATIENT)
Dept: GASTROENTEROLOGY | Facility: CLINIC | Age: 21
End: 2021-11-08
Attending: PHYSICIAN ASSISTANT
Payer: COMMERCIAL

## 2021-11-08 ENCOUNTER — MYC MEDICAL ADVICE (OUTPATIENT)
Dept: FAMILY MEDICINE | Facility: CLINIC | Age: 21
End: 2021-11-08

## 2021-11-08 DIAGNOSIS — K90.0 CELIAC DISEASE: Primary | ICD-10-CM

## 2021-11-08 DIAGNOSIS — Z91.011 MILK ALLERGY: ICD-10-CM

## 2021-11-08 PROCEDURE — 97802 MEDICAL NUTRITION INDIV IN: CPT | Mod: GT | Performed by: DIETITIAN, REGISTERED

## 2021-11-08 NOTE — LETTER
"    11/8/2021         RE: Celina Fam  [address] [facility] [id number]        Dear Colleague,    Thank you for referring your patient, Celina Fam, to the [facility] [place]. Please see a copy of my visit note below.    Celina Fam 21 year old female who is being evaluated via a billable video visit.      The patient has been notified of following:     \"This video visit will be conducted via a call between you and your physician/provider. We have found that certain health care needs can be provided without the need for an in-person physical exam.  This service lets us provide the care you need with a video conversation.  If a prescription is necessary we can send it directly to your pharmacy.  If lab work is needed we can place an order for that and you can then stop by our lab to have the test done at a later time.    Video visits are billed at different rates depending on your insurance coverage.  Please reach out to your insurance provider with any questions.    If during the course of the call the physician/provider feels a video visit is not appropriate, you will not be charged for this service.\"    Patient has given verbal consent for Video visit? Yes    How would you like to obtain your AVS? MyChart  If you are dropped from the video visit, the video invite should be resent to: Other e-mail: my chart   Will anyone else be joining your video visit? No      Video-Visit Details    Type of service:  Video Visit    Video Start Time: 10:23 AM   Video End Time: 11:30 AM    Originating Location (pt. Location): Home    Distant Location (provider location):  [facility] [place]     Platform used for Video Visit: mChron    During this virtual visit the patient is located in [place], patient verifies this as the location during the entirety of this visit.     [facility] Outpatient Medical Nutrition Therapy " "     Time Spent:  67 minutes  Session Type:  Initial   Referring Physician:  Nafisa Thomas PA-C  Reason for RD Visit:   Celiac disease     Nutrition Assessment:  Patient is a 21 year old female with history of recent new diagnosis of celiac disease, milk allergy and anxiety.  She stated that she was diagnosed with celiac disease this past summer and began.  Strict gluten-free diet to the best of her knowledge in August (2 months ago).  She stated that in the past before her diagnosis she would feel achy after eating, and finally after eating a deep dish pizza 1 night she had vomiting significant symptoms for a while after this which led her to get further testing.  She also reported having elevated liver enzyme/labs and noted to have acute hepatitis.  She reported that she has variable stools of either loose stools or can also have constipation.  She also reported gaining weight and had gotten to her highest weight of 132 over the summer.  She has been working on trying to lose weight and currently down to 128 pounds intentionally.  She works out at least 5 times per week.  She stated that occasionally she has issues with bloating overnight and will wake up feeling bloated in the morning intermittently.  She lives with her family who does eat gluten at home.  She has separate equipment and food, but does share a toaster with her family.  Additionally she follows a vegetarian diet.  She eat eggs, but avoids milk ingredients as she stated she tested positive for milk allergy in the past.    Patient Active Problem List   Diagnosis     Acne     Anxiety     Celiac disease     Milk allergy     Height:   Ht Readings from Last 1 Encounters:   12/10/20 1.549 m (5' 1\")     Weight:  Wt Readings from Last 10 Encounters:   07/14/21 59.9 kg (132 lb)   12/10/20 57 kg (125 lb 9.6 oz)   07/22/19 57.2 kg (126 lb)   05/22/18 55.3 kg (122 lb) (40 %, Z= -0.24)*   11/22/17 59.1 kg (130 lb 6.4 oz) (59 %, Z= 0.23)*   02/25/17 53.5 kg " "(118 lb) (38 %, Z= -0.31)*   02/24/17 55.3 kg (122 lb) (46 %, Z= -0.09)*   01/25/17 54.4 kg (120 lb) (43 %, Z= -0.19)*   09/01/16 55.3 kg (122 lb) (49 %, Z= -0.03)*   08/01/16 54.9 kg (121 lb) (47 %, Z= -0.07)*     * Growth percentiles are based on CDC (Girls, 2-20 Years) data.        BMI: Estimated body mass index is 24.94 kg/m  as calculated from the following:    Height as of 12/10/20: 1.549 m (5' 1\").    Weight as of 7/14/21: 59.9 kg (132 lb).    Diet Recall:  (some usual/recent meals/snacks/beverages): follows a vegetarian diet and has a milk allergy and now new dx of celiac diagnosis. Meal preps for lunch meals. Workouts Monday-Friday. Wants to eat healthier and lose a little weight.   Meal Food    Breakfast gluten free granola bar/pro bar or banana or oats with pro powder or Pb2, irene seeds, almond milk   Lunch Homemade Salad spinach, kale, poppyseed, peppers, chick peas and black beans or homemade burrito bowl (beans, tofu, quinoa and veggies)   Dinner Burrito bowl or GF pasta with peppers, spinach and tofu or black beans or quinoa and vegs   Snacks Granola bar or trail mix or banana and sometimes candy jar at work 3 or less fun size packs (skittles). Some nights: Organic GF pea protein shake made with almond milk or berries, spinach, pro powder, hemp/irene/flax seeds julius milk smoothie after a workout, later at night (9:30 PM) or sometimes GF sweets at night.   Beverages 1-2 c coffee, 48 oz water, almond milk with protein drinks   Alcohol Intake On weekends 1 night: 5-6 drinks of red wine or tequila mixed drink      Labs:    Last Comprehensive Metabolic Panel:  Sodium   Date Value Ref Range Status   10/14/2021 140 133 - 144 mmol/L Final     Potassium   Date Value Ref Range Status   10/14/2021 3.9 3.4 - 5.3 mmol/L Final     Chloride   Date Value Ref Range Status   10/14/2021 109 94 - 109 mmol/L Final     Carbon Dioxide (CO2)   Date Value Ref Range Status   10/14/2021 23 20 - 32 mmol/L Final     Anion Gap "   Date Value Ref Range Status   10/14/2021 8 3 - 14 mmol/L Final     Glucose   Date Value Ref Range Status   10/14/2021 75 70 - 99 mg/dL Final     Urea Nitrogen   Date Value Ref Range Status   10/14/2021 8 7 - 30 mg/dL Final     Creatinine   Date Value Ref Range Status   10/14/2021 0.75 0.52 - 1.04 mg/dL Final     GFR Estimate   Date Value Ref Range Status   10/14/2021 >90 >60 mL/min/1.73m2 Final     Comment:     As of July 11, 2021, eGFR is calculated by the CKD-EPI creatinine equation, without race adjustment. eGFR can be influenced by muscle mass, exercise, and diet. The reported eGFR is an estimation only and is only applicable if the renal function is stable.     Calcium   Date Value Ref Range Status   10/14/2021 8.7 8.5 - 10.1 mg/dL Final     CBC RESULTS:   Recent Labs   Lab Test 10/14/21  1200 07/14/21  0745   WBC  --  4.3   RBC  --  4.78   HGB  --  11.3*   HCT  --  36.5   MCV  --  76*   MCH  --  23.6*   MCHC  --  31.0*   RDW  --  16.6*    271       Pertinent Medications/vitamin and mineral supplements:   She reported taking a vit C + iron combo supp but didn't recall the specific name, omega 3, vit D, vit A and probiotic.     No current outpatient medications on file.     No current facility-administered medications for this visit.       Food Allergies:  Gluten and milk  Physical Activity:  Works out about 5-6 times per week. About 5 times per week does 10 min cardio warm up then weights for 45 minutes. On weekends take a 1 hr dog walk, once per week.     MALNUTRITION:  % Weight Loss:  None noted  % Intake:  No decreased intake noted  Subcutaneous Fat Loss:  None observed  Muscle Loss:  None observed  Fluid Retention:  None noted    Malnutrition Diagnosis: Patient does not meet two of the above criteria necessary for diagnosing malnutrition  In Context of:  Chronic illness or disease    Nutrition Diagnosis:      Food and nutrition related knowledge deficit related to lack of formal previous diet  education for celiac disease as evidenced by pt report and interest in diet education and recent new diagnosis.    Nutrition Prescription: strict gluten free diet    Nutrition Intervention:    Nutrition Education/Counseling:  Provided diet education related to celiac disease and gluten free diet. Provided overview of celiac disease and recommendation to follow a strict gluten free diet. Discussed foods containing gluten, some hidden gluten ingredients. Recommended reading foods labels and provided tips reading labels and provided tips on some ingredients to avoid on gluten free diet and ingredients that okay on gluten free diet. Also reviewed some ingredients that may or may not contain gluten.     Recommended avoiding cross contamination of gluten ingredients with cooking, cleaning counters well before preparing gluten free foods and after preparing any foods containing gluten. Recommended using separate cooking equipment, toasters, peanut butter/jelly/condiment jars if any chance that gluten containing crumbs will be in those jars/containers. Also to use separate frying pans, pans, cutting boards and utensils when cooking. Discussed some gluten free products on the market based on patient's food preferences. Also discussed some gluten free cooking tips. Discussed some gluten free websites. Recommended speaking with Pharmacists about any medications and/or vitamin/mineral supplements patient takes to find out if they are gluten free. Discussion other potential sources of gluten such as lip balms, mouthwashes, toothpaste, and other beauty products especially those that could be ingested.     Provided celiac disease and gluten free diet education handout. Pt expressed understanding of diet education and has no further questions at this time.     Educational Materials Provided:  Nutrition Care Manual: Celiac nutrition therapy, celiac label reading tips and celiac healthy eating tips and [facility]V celiac disease  handout.    Patient verbalized understanding of education provided. See Goals below.     Goals:  1. Follow strict gluten free diet.     2. Pre-plan menus and meals ahead of time can help you stick to a gluten free diet.    3. Avoid cross contamination especially when preparing and cooking gluten free food and beverages.  -Cleaning counters well before preparing gluten free foods and after preparing any foods containing gluten.  -Use separate cooking equipment, toasters and and also use separate frying pans, pans, cutting boards and utensils when cooking.  -Use separate peanut butter/jelly/braxton/mustard/condiment jars if any chance that gluten containing crumbs will be in those jars/containers.    -Use glass storage containers over plastic especially if sharing any storage containers with others who are not on gluten free diet.    4. Can speak with Pharmacists about any medications and/or vitamin/mineral supplements you take to find out if they are gluten free and/or check labels and websites for vitamin/mineral supplements to see if listed as gluten free.    5. Read food labels and double check that lip balms, mouthwashes, toothpaste, and other beauty products especially those that could be ingested are gluten free. Can review company websites to see if this information is provided/answered on their websites.     6. If eating or taking out meals, double check restaurant's website ahead of time to see if gluten free options. Can call ahead and discuss with restaurant and/or if out to eat, let your  know you have an allergy to gluten.  --Some local gluten free restaurants are [employer] and [employer]. [employer] is also a gluten free bakery.    7. [employer] has some easy to prepare gluten free recipes (but note: not all of her recipes are gluten free).     8. Celiac Foundation at celiac.org is an additional resource for you.    9. Can use an altagracia to scan food labels to let you know if gluten  "free. One example is \"[employer]\" altagracia. (this altagracia will also let you scan for milk ingredients as well as gluten).    10. To help see if you can identify any specific foods or beverages or patterns of eating causing more overnight bloating for you, recommend keeping food and beverage journals and tracking symptoms to see if you can identify specific triggers or patterns of eating leading to more bloating.    Nutrition Monitoring and Evaluation: Will monitor adherence to nutrition recommendations at future RD visits.     Further Medical Nutrition Therapy:  Follow-up in 2-3 months or as needed.    Patient was encouraged to call/contact RD with any further questions.    Birgit Fong, MS, RD, LD          Again, thank you for allowing me to participate in the care of your patient.        Sincerely,        Birgit Fong RD    "

## 2021-11-08 NOTE — PROGRESS NOTES
"Celina Fam 21 year old female who is being evaluated via a billable video visit.      The patient has been notified of following:     \"This video visit will be conducted via a call between you and your physician/provider. We have found that certain health care needs can be provided without the need for an in-person physical exam.  This service lets us provide the care you need with a video conversation.  If a prescription is necessary we can send it directly to your pharmacy.  If lab work is needed we can place an order for that and you can then stop by our lab to have the test done at a later time.    Video visits are billed at different rates depending on your insurance coverage.  Please reach out to your insurance provider with any questions.    If during the course of the call the physician/provider feels a video visit is not appropriate, you will not be charged for this service.\"    Patient has given verbal consent for Video visit? Yes    How would you like to obtain your AVS? MyChart  If you are dropped from the video visit, the video invite should be resent to: Other e-mail: my chart   Will anyone else be joining your video visit? No      Video-Visit Details    Type of service:  Video Visit    Video Start Time: 10:23 AM   Video End Time: 11:30 AM    Originating Location (pt. Location): Home    Distant Location (provider location):  [facility] [place]     Platform used for Video Visit: [employer]    During this virtual visit the patient is located in [place], patient verifies this as the location during the entirety of this visit.     [facility] Outpatient Medical Nutrition Therapy      Time Spent:  67 minutes  Session Type:  Initial   Referring Physician:  Nafisa Thomas PA-C  Reason for RD Visit:   Celiac disease     Nutrition Assessment:  Patient is a 21 year old female with history of recent new diagnosis of celiac disease, milk allergy and anxiety.  She stated " "that she was diagnosed with celiac disease this past summer and began.  Strict gluten-free diet to the best of her knowledge in August (2 months ago).  She stated that in the past before her diagnosis she would feel achy after eating, and finally after eating a deep dish pizza 1 night she had vomiting significant symptoms for a while after this which led her to get further testing.  She also reported having elevated liver enzyme/labs and noted to have acute hepatitis.  She reported that she has variable stools of either loose stools or can also have constipation.  She also reported gaining weight and had gotten to her highest weight of 132 over the summer.  She has been working on trying to lose weight and currently down to 128 pounds intentionally.  She works out at least 5 times per week.  She stated that occasionally she has issues with bloating overnight and will wake up feeling bloated in the morning intermittently.  She lives with her family who does eat gluten at home.  She has separate equipment and food, but does share a toaster with her family.  Additionally she follows a vegetarian diet.  She eat eggs, but avoids milk ingredients as she stated she tested positive for milk allergy in the past.    Patient Active Problem List   Diagnosis     Acne     Anxiety     Celiac disease     Milk allergy     Height:   Ht Readings from Last 1 Encounters:   12/10/20 1.549 m (5' 1\")     Weight:  Wt Readings from Last 10 Encounters:   07/14/21 59.9 kg (132 lb)   12/10/20 57 kg (125 lb 9.6 oz)   07/22/19 57.2 kg (126 lb)   05/22/18 55.3 kg (122 lb) (40 %, Z= -0.24)*   11/22/17 59.1 kg (130 lb 6.4 oz) (59 %, Z= 0.23)*   02/25/17 53.5 kg (118 lb) (38 %, Z= -0.31)*   02/24/17 55.3 kg (122 lb) (46 %, Z= -0.09)*   01/25/17 54.4 kg (120 lb) (43 %, Z= -0.19)*   09/01/16 55.3 kg (122 lb) (49 %, Z= -0.03)*   08/01/16 54.9 kg (121 lb) (47 %, Z= -0.07)*     * Growth percentiles are based on CDC (Girls, 2-20 Years) data.        BMI: " "Estimated body mass index is 24.94 kg/m  as calculated from the following:    Height as of 12/10/20: 1.549 m (5' 1\").    Weight as of 7/14/21: 59.9 kg (132 lb).    Diet Recall:  (some usual/recent meals/snacks/beverages): follows a vegetarian diet and has a milk allergy and now new dx of celiac diagnosis. Meal preps for lunch meals. Workouts Monday-Friday. Wants to eat healthier and lose a little weight.   Meal Food    Breakfast gluten free granola bar/pro bar or banana or oats with pro powder or Pb2, irene seeds, almond milk   Lunch Homemade Salad spinach, kale, poppyseed, peppers, chick peas and black beans or homemade burrito bowl (beans, tofu, quinoa and veggies)   Dinner Burrito bowl or GF pasta with peppers, spinach and tofu or black beans or quinoa and vegs   Snacks Granola bar or trail mix or banana and sometimes candy jar at work 3 or less fun size packs (skittles). Some nights: Organic GF pea protein shake made with almond milk or berries, spinach, pro powder, hemp/irene/flax seeds julius milk smoothie after a workout, later at night (9:30 PM) or sometimes GF sweets at night.   Beverages 1-2 c coffee, 48 oz water, almond milk with protein drinks   Alcohol Intake On weekends 1 night: 5-6 drinks of red wine or tequila mixed drink      Labs:    Last Comprehensive Metabolic Panel:  Sodium   Date Value Ref Range Status   10/14/2021 140 133 - 144 mmol/L Final     Potassium   Date Value Ref Range Status   10/14/2021 3.9 3.4 - 5.3 mmol/L Final     Chloride   Date Value Ref Range Status   10/14/2021 109 94 - 109 mmol/L Final     Carbon Dioxide (CO2)   Date Value Ref Range Status   10/14/2021 23 20 - 32 mmol/L Final     Anion Gap   Date Value Ref Range Status   10/14/2021 8 3 - 14 mmol/L Final     Glucose   Date Value Ref Range Status   10/14/2021 75 70 - 99 mg/dL Final     Urea Nitrogen   Date Value Ref Range Status   10/14/2021 8 7 - 30 mg/dL Final     Creatinine   Date Value Ref Range Status   10/14/2021 0.75 0.52 - " 1.04 mg/dL Final     GFR Estimate   Date Value Ref Range Status   10/14/2021 >90 >60 mL/min/1.73m2 Final     Comment:     As of July 11, 2021, eGFR is calculated by the CKD-EPI creatinine equation, without race adjustment. eGFR can be influenced by muscle mass, exercise, and diet. The reported eGFR is an estimation only and is only applicable if the renal function is stable.     Calcium   Date Value Ref Range Status   10/14/2021 8.7 8.5 - 10.1 mg/dL Final     CBC RESULTS:   Recent Labs   Lab Test 10/14/21  1200 07/14/21  0745   WBC  --  4.3   RBC  --  4.78   HGB  --  11.3*   HCT  --  36.5   MCV  --  76*   MCH  --  23.6*   MCHC  --  31.0*   RDW  --  16.6*    271       Pertinent Medications/vitamin and mineral supplements:   She reported taking a vit C + iron combo supp but didn't recall the specific name, omega 3, vit D, vit A and probiotic.     No current outpatient medications on file.     No current facility-administered medications for this visit.       Food Allergies:  Gluten and milk  Physical Activity:  Works out about 5-6 times per week. About 5 times per week does 10 min cardio warm up then weights for 45 minutes. On weekends take a 1 hr dog walk, once per week.     MALNUTRITION:  % Weight Loss:  None noted  % Intake:  No decreased intake noted  Subcutaneous Fat Loss:  None observed  Muscle Loss:  None observed  Fluid Retention:  None noted    Malnutrition Diagnosis: Patient does not meet two of the above criteria necessary for diagnosing malnutrition  In Context of:  Chronic illness or disease    Nutrition Diagnosis:      Food and nutrition related knowledge deficit related to lack of formal previous diet education for celiac disease as evidenced by pt report and interest in diet education and recent new diagnosis.    Nutrition Prescription: strict gluten free diet    Nutrition Intervention:    Nutrition Education/Counseling:  Provided diet education related to celiac disease and gluten free diet.  Provided overview of celiac disease and recommendation to follow a strict gluten free diet. Discussed foods containing gluten, some hidden gluten ingredients. Recommended reading foods labels and provided tips reading labels and provided tips on some ingredients to avoid on gluten free diet and ingredients that okay on gluten free diet. Also reviewed some ingredients that may or may not contain gluten.     Recommended avoiding cross contamination of gluten ingredients with cooking, cleaning counters well before preparing gluten free foods and after preparing any foods containing gluten. Recommended using separate cooking equipment, toasters, peanut butter/jelly/condiment jars if any chance that gluten containing crumbs will be in those jars/containers. Also to use separate frying pans, pans, cutting boards and utensils when cooking. Discussed some gluten free products on the market based on patient's food preferences. Also discussed some gluten free cooking tips. Discussed some gluten free websites. Recommended speaking with Pharmacists about any medications and/or vitamin/mineral supplements patient takes to find out if they are gluten free. Discussion other potential sources of gluten such as lip balms, mouthwashes, toothpaste, and other beauty products especially those that could be ingested.     Provided celiac disease and gluten free diet education handout. Pt expressed understanding of diet education and has no further questions at this time.     Educational Materials Provided:  Nutrition Care Manual: Celiac nutrition therapy, celiac label reading tips and celiac healthy eating tips and [facility] celiac disease handout.    Patient verbalized understanding of education provided. See Goals below.     Goals:  1. Follow strict gluten free diet.     2. Pre-plan menus and meals ahead of time can help you stick to a gluten free diet.    3. Avoid cross contamination especially when preparing and cooking gluten free food and  "beverages.  -Cleaning counters well before preparing gluten free foods and after preparing any foods containing gluten.  -Use separate cooking equipment, toasters and and also use separate frying pans, pans, cutting boards and utensils when cooking.  -Use separate peanut butter/jelly/braxton/mustard/condiment jars if any chance that gluten containing crumbs will be in those jars/containers.    -Use glass storage containers over plastic especially if sharing any storage containers with others who are not on gluten free diet.    4. Can speak with Pharmacists about any medications and/or vitamin/mineral supplements you take to find out if they are gluten free and/or check labels and websites for vitamin/mineral supplements to see if listed as gluten free.    5. Read food labels and double check that lip balms, mouthwashes, toothpaste, and other beauty products especially those that could be ingested are gluten free. Can review company websites to see if this information is provided/answered on their websites.     6. If eating or taking out meals, double check restaurant's website ahead of time to see if gluten free options. Can call ahead and discuss with restaurant and/or if out to eat, let your  know you have an allergy to gluten.  --Some local gluten free restaurants are [employer] and [employer]. [employer] is also a gluten free bakery.    7. [employer] has some easy to prepare gluten free recipes (but note: not all of her recipes are gluten free).     8. Celiac Foundation at celiac.org is an additional resource for you.    9. Can use an altagracia to scan food labels to let you know if gluten free. One example is \"[employer]\" altagracia. (this altagracia will also let you scan for milk ingredients as well as gluten).    10. To help see if you can identify any specific foods or beverages or patterns of eating causing more overnight bloating for you, recommend keeping food and beverage journals and tracking symptoms to " see if you can identify specific triggers or patterns of eating leading to more bloating.    Nutrition Monitoring and Evaluation: Will monitor adherence to nutrition recommendations at future RD visits.     Further Medical Nutrition Therapy:  Follow-up in 2-3 months or as needed.    Patient was encouraged to call/contact RD with any further questions.    Birgit Fong, MS, RD, LD

## 2021-11-08 NOTE — PATIENT INSTRUCTIONS
It was nice meeting you today. Below are the nutrition recommendations we discussed at your visit.    Please let me know if you have any additional questions.    Nutrition Recommendations    1. Follow strict gluten free diet.     2. Pre-plan menus and meals ahead of time can help you stick to a gluten free diet.    3. Avoid cross contamination especially when preparing and cooking gluten free food and beverages.  -Cleaning counters well before preparing gluten free foods and after preparing any foods containing gluten.  -Use separate cooking equipment, toasters and and also use separate frying pans, pans, cutting boards and utensils when cooking.  -Use separate peanut butter/jelly/braxton/mustard/condiment jars if any chance that gluten containing crumbs will be in those jars/containers.    -Use glass storage containers over plastic especially if sharing any storage containers with others who are not on gluten free diet.    4. Can speak with Pharmacists about any medications and/or vitamin/mineral supplements you take to find out if they are gluten free and/or check labels and websites for vitamin/mineral supplements to see if listed as gluten free.    5. Read food labels and double check that lip balms, mouthwashes, toothpaste, and other beauty products especially those that could be ingested are gluten free. Can review company websites to see if this information is provided/answered on their websites.     6. If eating or taking out meals, double check restaurant's website ahead of time to see if gluten free options. Can call ahead and discuss with restaurant and/or if out to eat, let your  know you have an allergy to gluten.  --Some local gluten free restaurants are [employer] and [employer]. [employer] is also a gluten free bakery.    7. [employer] has some easy to prepare gluten free recipes (but note: not all of her recipes are gluten free).     8. Celiac Foundation at celiac.org is an  "additional resource for you.    9. Can use an altagracia to scan food labels to let you know if gluten free. One example is \"[employer]\" altagracia. (this altagracia will also let you scan for milk ingredients as well as gluten).    10. To help see if you can identify any specific foods or beverages or patterns of eating causing more overnight bloating for you, recommend keeping food and beverage journals and tracking symptoms to see if you can identify specific triggers or patterns of eating leading to more bloating.    Can follow up in 2-3 months or as needed.    If you would like to schedule a follow up appointment, please call [phone].    Birgit Fong, MS, RD, LD    "

## 2021-11-15 ENCOUNTER — VIRTUAL VISIT (OUTPATIENT)
Dept: FAMILY MEDICINE | Facility: CLINIC | Age: 21
End: 2021-11-15
Payer: COMMERCIAL

## 2021-11-15 DIAGNOSIS — L70.9 ACNE, UNSPECIFIED ACNE TYPE: Primary | ICD-10-CM

## 2021-11-15 PROCEDURE — 99213 OFFICE O/P EST LOW 20 MIN: CPT | Mod: 95 | Performed by: PHYSICIAN ASSISTANT

## 2021-11-15 RX ORDER — DROSPIRENONE AND ETHINYL ESTRADIOL 0.02-3(28)
1 KIT ORAL DAILY
Qty: 84 TABLET | Refills: 3 | Status: SHIPPED | OUTPATIENT
Start: 2021-11-15 | End: 2022-06-17

## 2021-11-15 RX ORDER — TRETINOIN 0.4 MG/G
GEL TOPICAL DAILY
Qty: 45 G | Refills: 11 | Status: SHIPPED | OUTPATIENT
Start: 2021-11-15 | End: 2023-05-09

## 2021-11-15 ASSESSMENT — ANXIETY QUESTIONNAIRES
4. TROUBLE RELAXING: NOT AT ALL
GAD7 TOTAL SCORE: 1
7. FEELING AFRAID AS IF SOMETHING AWFUL MIGHT HAPPEN: NOT AT ALL
8. IF YOU CHECKED OFF ANY PROBLEMS, HOW DIFFICULT HAVE THESE MADE IT FOR YOU TO DO YOUR WORK, TAKE CARE OF THINGS AT HOME, OR GET ALONG WITH OTHER PEOPLE?: NOT DIFFICULT AT ALL
GAD7 TOTAL SCORE: 1
6. BECOMING EASILY ANNOYED OR IRRITABLE: SEVERAL DAYS
2. NOT BEING ABLE TO STOP OR CONTROL WORRYING: NOT AT ALL
1. FEELING NERVOUS, ANXIOUS, OR ON EDGE: NOT AT ALL
GAD7 TOTAL SCORE: 1
3. WORRYING TOO MUCH ABOUT DIFFERENT THINGS: NOT AT ALL
7. FEELING AFRAID AS IF SOMETHING AWFUL MIGHT HAPPEN: NOT AT ALL
5. BEING SO RESTLESS THAT IT IS HARD TO SIT STILL: NOT AT ALL

## 2021-11-15 ASSESSMENT — PATIENT HEALTH QUESTIONNAIRE - PHQ9
SUM OF ALL RESPONSES TO PHQ QUESTIONS 1-9: 3
SUM OF ALL RESPONSES TO PHQ QUESTIONS 1-9: 3
10. IF YOU CHECKED OFF ANY PROBLEMS, HOW DIFFICULT HAVE THESE PROBLEMS MADE IT FOR YOU TO DO YOUR WORK, TAKE CARE OF THINGS AT HOME, OR GET ALONG WITH OTHER PEOPLE: SOMEWHAT DIFFICULT

## 2021-11-15 NOTE — PROGRESS NOTES
Celina is a 21 year old who is being evaluated via a billable video visit.      How would you like to obtain your AVS? MyChart  If the video visit is dropped, the invitation should be resent by: Send to e-mail at: [email or web address]  Will anyone else be joining your video visit? No      Video Start Time: 11:34 AM    Assessment & Plan     Acne, unspecified acne type    Re-start birth control and try new prescription for tretinoin. Let us know if not effective. Recommend Benzoyl peroxide face/body wash as well. She is not sexually active so no concern for pregnancy.    - tretinoin microsphere (RETIN-A MICRO) 0.04 % external gel; Apply topically daily  - drospirenone-ethinyl estradiol (ADRIÁN) 3-0.02 MG tablet; Take 1 tablet by mouth daily             There are no Patient Instructions on file for this visit.    No follow-ups on file.    Antonio Moss PA-C  [facility]   Celina is a 21 year old who presents for the following health issues     History of Present Illness       She eats 4 or more servings of fruits and vegetables daily.She consumes 2 sweetened beverage(s) daily.She exercises with enough effort to increase her heart rate 60 or more minutes per day.  She exercises with enough effort to increase her heart rate 5 days per week.   She is taking medications regularly.       Acne on chest, back, and face. Has been using an old tretinoin cream which helps a little but is several years old. Stopped birth control in August and acne returned after that. Willing to re-start birth control. She is not sexually active. Has tried several acne face washes without significant improvement. Has a Benzoyl peroxide one at home.       Review of Systems   Constitutional, HEENT, cardiovascular, pulmonary, gi and gu systems are negative, except as otherwise noted.      Objective           Vitals:  No vitals were obtained today due to virtual visit.    Physical Exam   GENERAL: Healthy,  alert and no distress  EYES: Eyes grossly normal to inspection.  No discharge or erythema, or obvious scleral/conjunctival abnormalities.  RESP: No audible wheeze, cough, or visible cyanosis.  No visible retractions or increased work of breathing.    SKIN: acne  NEURO: Cranial nerves grossly intact.  Mentation and speech appropriate for age.  PSYCH: Mentation appears normal, affect normal/bright, judgement and insight intact, normal speech and appearance well-groomed.                Video-Visit Details    Type of service:  Video Visit    Video End Time:11:44 AM    Originating Location (pt. Location): Home    Distant Location (provider location):  [facility] [place]     Platform used for Video Visit: [employer]  Answers for HPI/ROS submitted by the patient on 11/15/2021  If you checked off any problems, how difficult have these problems made it for you to do your work, take care of things at home, or get along with other people?: Somewhat difficult  PHQ9 TOTAL SCORE: 3  HALI 7 TOTAL SCORE: 1

## 2021-11-16 ASSESSMENT — ANXIETY QUESTIONNAIRES: GAD7 TOTAL SCORE: 1

## 2021-11-16 ASSESSMENT — PATIENT HEALTH QUESTIONNAIRE - PHQ9: SUM OF ALL RESPONSES TO PHQ QUESTIONS 1-9: 3

## 2021-12-06 ENCOUNTER — MYC MEDICAL ADVICE (OUTPATIENT)
Dept: FAMILY MEDICINE | Facility: CLINIC | Age: 21
End: 2021-12-06
Payer: COMMERCIAL

## 2021-12-08 ENCOUNTER — LAB (OUTPATIENT)
Dept: LAB | Facility: CLINIC | Age: 21
End: 2021-12-08
Payer: COMMERCIAL

## 2021-12-08 ENCOUNTER — TELEPHONE (OUTPATIENT)
Dept: FAMILY MEDICINE | Facility: CLINIC | Age: 21
End: 2021-12-08
Payer: COMMERCIAL

## 2021-12-08 DIAGNOSIS — L70.9 ACNE, UNSPECIFIED ACNE TYPE: Primary | ICD-10-CM

## 2021-12-08 DIAGNOSIS — E50.9 VITAMIN A DEFICIENCY: ICD-10-CM

## 2021-12-08 DIAGNOSIS — R74.8 ELEVATED LIVER ENZYMES: ICD-10-CM

## 2021-12-08 DIAGNOSIS — R79.89 ELEVATED LIVER FUNCTION TESTS: ICD-10-CM

## 2021-12-08 DIAGNOSIS — L70.9 ACNE, UNSPECIFIED ACNE TYPE: ICD-10-CM

## 2021-12-08 PROCEDURE — 36415 COLL VENOUS BLD VENIPUNCTURE: CPT

## 2021-12-08 PROCEDURE — 80053 COMPREHEN METABOLIC PANEL: CPT

## 2021-12-08 PROCEDURE — 99000 SPECIMEN HANDLING OFFICE-LAB: CPT

## 2021-12-08 PROCEDURE — 82248 BILIRUBIN DIRECT: CPT

## 2021-12-08 PROCEDURE — 84590 ASSAY OF VITAMIN A: CPT | Mod: 90

## 2021-12-09 ENCOUNTER — VIRTUAL VISIT (OUTPATIENT)
Dept: FAMILY MEDICINE | Facility: CLINIC | Age: 21
End: 2021-12-09
Payer: COMMERCIAL

## 2021-12-09 DIAGNOSIS — L70.0 ACNE VULGARIS: Primary | ICD-10-CM

## 2021-12-09 LAB
ALBUMIN SERPL-MCNC: 3.3 G/DL (ref 3.4–5)
ALP SERPL-CCNC: 90 U/L (ref 40–150)
ALT SERPL W P-5'-P-CCNC: 257 U/L (ref 0–50)
ANION GAP SERPL CALCULATED.3IONS-SCNC: 4 MMOL/L (ref 3–14)
AST SERPL W P-5'-P-CCNC: 163 U/L (ref 0–45)
BILIRUB DIRECT SERPL-MCNC: 0.2 MG/DL (ref 0–0.2)
BILIRUB SERPL-MCNC: 0.5 MG/DL (ref 0.2–1.3)
BUN SERPL-MCNC: 9 MG/DL (ref 7–30)
CALCIUM SERPL-MCNC: 8.9 MG/DL (ref 8.5–10.1)
CHLORIDE BLD-SCNC: 106 MMOL/L (ref 94–109)
CO2 SERPL-SCNC: 27 MMOL/L (ref 20–32)
CREAT SERPL-MCNC: 0.62 MG/DL (ref 0.52–1.04)
GFR SERPL CREATININE-BSD FRML MDRD: >90 ML/MIN/1.73M2
GLUCOSE BLD-MCNC: 98 MG/DL (ref 70–99)
POTASSIUM BLD-SCNC: 4.4 MMOL/L (ref 3.4–5.3)
PROT SERPL-MCNC: 7.1 G/DL (ref 6.8–8.8)
SODIUM SERPL-SCNC: 137 MMOL/L (ref 133–144)

## 2021-12-09 PROCEDURE — 99214 OFFICE O/P EST MOD 30 MIN: CPT | Mod: 95 | Performed by: NURSE PRACTITIONER

## 2021-12-09 RX ORDER — CLINDAMYCIN PHOSPHATE 10 UG/ML
LOTION TOPICAL 2 TIMES DAILY
Qty: 60 ML | Refills: 1 | Status: SHIPPED | OUTPATIENT
Start: 2021-12-09 | End: 2022-06-17

## 2021-12-09 RX ORDER — SPIRONOLACTONE 25 MG/1
25 TABLET ORAL DAILY
Qty: 30 TABLET | Refills: 0 | Status: SHIPPED | OUTPATIENT
Start: 2021-12-09 | End: 2022-01-11

## 2021-12-09 NOTE — PROGRESS NOTES
"Celina is a 21 year old who is being evaluated via a billable telephone visit.      What phone number would you like to be contacted at? [phone]  How would you like to obtain your AVS? MyChart    Assessment & Plan     Acne vulgaris  Uncontrolled. Currently on low androgenic OCP, will add topical clindamycin. Discussed request of spirolactone and agreed on trial. Discussed medication use, risks, benefits, and side effects.  Push fluids.   Recheck of blood pressure and labs 4 weeks; sooner as needed.   - clindamycin (CLEOCIN T) 1 % external lotion  Dispense: 60 mL; Refill: 1  - spironolactone (ALDACTONE) 25 MG tablet  Dispense: 30 tablet; Refill: 0  - Comprehensive metabolic panel (BMP + Alb, Alk Phos, ALT, AST, Total. Bili, TP)                   Return in about 4 weeks (around 1/6/2022) for MA Only Visit for Blood Pressure Check, Lab Work.    [facility]ANESA Valer[facility] [place]   Celina is a 21 year old who presents for the following health issues     HPI     Medication Followup of Retin-A    Taking Medication as prescribed: yes    Side Effects:  None    Medication Helping Symptoms:  NO     Restarted OCP one month ago. Notable increase of acne during restart.   Has used topical antibiotic in past.   Would like to consider spirolactone.       Review of Systems         Objective    Vitals - Patient Reported  Weight (Patient Reported): 57.2 kg (126 lb)  Height (Patient Reported): 154.9 cm (5' 1\")  BMI (Based on Pt Reported Ht/Wt): 23.81      Vitals:  No vitals were obtained today due to virtual visit.    Physical Exam   healthy, alert and no distress  PSYCH: Alert and oriented times 3; coherent speech, normal   rate and volume, able to articulate logical thoughts, able   to abstract reason, no tangential thoughts, no hallucinations   or delusions  Her affect is normal  RESP: No cough, no audible wheezing, able to talk in full sentences  Remainder of exam unable to be " completed due to telephone visits                 Phone call duration: 13 minutes

## 2021-12-10 ENCOUNTER — HOSPITAL ENCOUNTER (EMERGENCY)
Facility: CLINIC | Age: 21
Discharge: HOME | End: 2021-12-10
Attending: EMERGENCY MEDICINE | Admitting: EMERGENCY MEDICINE
Payer: COMMERCIAL

## 2021-12-10 ENCOUNTER — APPOINTMENT (OUTPATIENT)
Dept: CT IMAGING | Facility: CLINIC | Age: 21
End: 2021-12-10
Attending: EMERGENCY MEDICINE
Payer: COMMERCIAL

## 2021-12-10 ENCOUNTER — NURSE TRIAGE (OUTPATIENT)
Dept: FAMILY MEDICINE | Facility: CLINIC | Age: 21
End: 2021-12-10
Payer: COMMERCIAL

## 2021-12-10 DIAGNOSIS — R10.9 FLANK PAIN: ICD-10-CM

## 2021-12-10 DIAGNOSIS — N12 PYELONEPHRITIS: ICD-10-CM

## 2021-12-10 LAB
ALBUMIN SERPL-MCNC: 3.2 G/DL (ref 3.4–5)
ALBUMIN UR-MCNC: 70 MG/DL
ALP SERPL-CCNC: 86 U/L (ref 40–150)
ALT SERPL W P-5'-P-CCNC: 241 U/L (ref 0–50)
ANION GAP SERPL CALCULATED.3IONS-SCNC: 6 MMOL/L (ref 3–14)
APPEARANCE UR: ABNORMAL
AST SERPL W P-5'-P-CCNC: 156 U/L (ref 0–45)
BACTERIA #/AREA URNS HPF: ABNORMAL /HPF
BILIRUB DIRECT SERPL-MCNC: 0.1 MG/DL (ref 0–0.2)
BILIRUB SERPL-MCNC: 0.4 MG/DL (ref 0.2–1.3)
BILIRUB UR QL STRIP: NEGATIVE
BUN SERPL-MCNC: 6 MG/DL (ref 7–30)
CALCIUM SERPL-MCNC: 8.9 MG/DL (ref 8.5–10.1)
CHLORIDE BLD-SCNC: 107 MMOL/L (ref 94–109)
CO2 SERPL-SCNC: 24 MMOL/L (ref 20–32)
COLOR UR AUTO: YELLOW
CREAT SERPL-MCNC: 0.68 MG/DL (ref 0.52–1.04)
D DIMER PPP FEU-MCNC: 0.85 UG/ML FEU (ref 0–0.5)
ERYTHROCYTE [DISTWIDTH] IN BLOOD BY AUTOMATED COUNT: 16 % (ref 10–15)
GFR SERPL CREATININE-BSD FRML MDRD: >90 ML/MIN/1.73M2
GLUCOSE BLD-MCNC: 93 MG/DL (ref 70–99)
GLUCOSE UR STRIP-MCNC: NEGATIVE MG/DL
HCG SERPL QL: NEGATIVE
HCG UR QL: NEGATIVE
HCT VFR BLD AUTO: 42.9 % (ref 35–47)
HGB BLD-MCNC: 13.7 G/DL (ref 11.7–15.7)
HGB UR QL STRIP: ABNORMAL
KETONES UR STRIP-MCNC: NEGATIVE MG/DL
LEUKOCYTE ESTERASE UR QL STRIP: ABNORMAL
LIPASE SERPL-CCNC: 76 U/L (ref 73–393)
MCH RBC QN AUTO: 26.6 PG (ref 26.5–33)
MCHC RBC AUTO-ENTMCNC: 31.9 G/DL (ref 31.5–36.5)
MCV RBC AUTO: 83 FL (ref 78–100)
MUCOUS THREADS #/AREA URNS LPF: PRESENT /LPF
NITRATE UR QL: NEGATIVE
PH UR STRIP: 5.5 [PH] (ref 5–7)
PLATELET # BLD AUTO: 252 10E3/UL (ref 150–450)
POTASSIUM BLD-SCNC: 3.8 MMOL/L (ref 3.4–5.3)
PROT SERPL-MCNC: 7 G/DL (ref 6.8–8.8)
RBC # BLD AUTO: 5.16 10E6/UL (ref 3.8–5.2)
RBC URINE: >182 /HPF
SODIUM SERPL-SCNC: 137 MMOL/L (ref 133–144)
SP GR UR STRIP: 1.02 (ref 1–1.03)
SQUAMOUS EPITHELIAL: 1 /HPF
TROPONIN I SERPL HS-MCNC: 8 NG/L
UROBILINOGEN UR STRIP-MCNC: NORMAL MG/DL
WBC # BLD AUTO: 5.3 10E3/UL (ref 4–11)
WBC URINE: >182 /HPF

## 2021-12-10 PROCEDURE — 250N000009 HC RX 250: Performed by: EMERGENCY MEDICINE

## 2021-12-10 PROCEDURE — 83690 ASSAY OF LIPASE: CPT | Performed by: EMERGENCY MEDICINE

## 2021-12-10 PROCEDURE — 82040 ASSAY OF SERUM ALBUMIN: CPT | Performed by: EMERGENCY MEDICINE

## 2021-12-10 PROCEDURE — 36415 COLL VENOUS BLD VENIPUNCTURE: CPT | Performed by: EMERGENCY MEDICINE

## 2021-12-10 PROCEDURE — 81025 URINE PREGNANCY TEST: CPT | Performed by: EMERGENCY MEDICINE

## 2021-12-10 PROCEDURE — 258N000003 HC RX IP 258 OP 636: Performed by: EMERGENCY MEDICINE

## 2021-12-10 PROCEDURE — 96375 TX/PRO/DX INJ NEW DRUG ADDON: CPT

## 2021-12-10 PROCEDURE — 96365 THER/PROPH/DIAG IV INF INIT: CPT | Mod: 59

## 2021-12-10 PROCEDURE — 93005 ELECTROCARDIOGRAM TRACING: CPT

## 2021-12-10 PROCEDURE — 99285 EMERGENCY DEPT VISIT HI MDM: CPT | Mod: 25

## 2021-12-10 PROCEDURE — 250N000011 HC RX IP 250 OP 636: Performed by: EMERGENCY MEDICINE

## 2021-12-10 PROCEDURE — 81001 URINALYSIS AUTO W/SCOPE: CPT | Performed by: EMERGENCY MEDICINE

## 2021-12-10 PROCEDURE — 96361 HYDRATE IV INFUSION ADD-ON: CPT

## 2021-12-10 PROCEDURE — 85027 COMPLETE CBC AUTOMATED: CPT | Performed by: EMERGENCY MEDICINE

## 2021-12-10 PROCEDURE — 80048 BASIC METABOLIC PNL TOTAL CA: CPT | Performed by: EMERGENCY MEDICINE

## 2021-12-10 PROCEDURE — 71275 CT ANGIOGRAPHY CHEST: CPT

## 2021-12-10 PROCEDURE — C9113 INJ PANTOPRAZOLE SODIUM, VIA: HCPCS | Performed by: EMERGENCY MEDICINE

## 2021-12-10 PROCEDURE — 74176 CT ABD & PELVIS W/O CONTRAST: CPT

## 2021-12-10 PROCEDURE — 84703 CHORIONIC GONADOTROPIN ASSAY: CPT | Performed by: EMERGENCY MEDICINE

## 2021-12-10 PROCEDURE — 85379 FIBRIN DEGRADATION QUANT: CPT | Performed by: EMERGENCY MEDICINE

## 2021-12-10 PROCEDURE — 250N000013 HC RX MED GY IP 250 OP 250 PS 637: Performed by: EMERGENCY MEDICINE

## 2021-12-10 PROCEDURE — 84484 ASSAY OF TROPONIN QUANT: CPT | Performed by: EMERGENCY MEDICINE

## 2021-12-10 PROCEDURE — 87086 URINE CULTURE/COLONY COUNT: CPT | Performed by: EMERGENCY MEDICINE

## 2021-12-10 RX ORDER — MAGNESIUM HYDROXIDE/ALUMINUM HYDROXICE/SIMETHICONE 120; 1200; 1200 MG/30ML; MG/30ML; MG/30ML
30 SUSPENSION ORAL ONCE
Status: COMPLETED | OUTPATIENT
Start: 2021-12-10 | End: 2021-12-10

## 2021-12-10 RX ORDER — ONDANSETRON 2 MG/ML
4 INJECTION INTRAMUSCULAR; INTRAVENOUS ONCE
Status: COMPLETED | OUTPATIENT
Start: 2021-12-10 | End: 2021-12-10

## 2021-12-10 RX ORDER — CEFTRIAXONE 1 G/1
1 INJECTION, POWDER, FOR SOLUTION INTRAMUSCULAR; INTRAVENOUS ONCE
Status: COMPLETED | OUTPATIENT
Start: 2021-12-10 | End: 2021-12-10

## 2021-12-10 RX ORDER — OXYCODONE HYDROCHLORIDE 5 MG/1
5 TABLET ORAL ONCE
Status: COMPLETED | OUTPATIENT
Start: 2021-12-10 | End: 2021-12-10

## 2021-12-10 RX ORDER — HYDROMORPHONE HYDROCHLORIDE 1 MG/ML
0.5 INJECTION, SOLUTION INTRAMUSCULAR; INTRAVENOUS; SUBCUTANEOUS ONCE
Status: COMPLETED | OUTPATIENT
Start: 2021-12-10 | End: 2021-12-10

## 2021-12-10 RX ORDER — OXYCODONE HYDROCHLORIDE 5 MG/1
5 TABLET ORAL EVERY 6 HOURS PRN
Qty: 6 TABLET | Refills: 0 | Status: SHIPPED | OUTPATIENT
Start: 2021-12-10 | End: 2022-01-10

## 2021-12-10 RX ORDER — OXYCODONE HYDROCHLORIDE 5 MG/1
5 TABLET ORAL EVERY 6 HOURS PRN
Qty: 10 TABLET | Refills: 0 | Status: SHIPPED | OUTPATIENT
Start: 2021-12-10 | End: 2021-12-10

## 2021-12-10 RX ORDER — ONDANSETRON 4 MG/1
4 TABLET, ORALLY DISINTEGRATING ORAL EVERY 8 HOURS PRN
Qty: 10 TABLET | Refills: 0 | Status: SHIPPED | OUTPATIENT
Start: 2021-12-10 | End: 2022-01-10

## 2021-12-10 RX ORDER — IOPAMIDOL 755 MG/ML
500 INJECTION, SOLUTION INTRAVASCULAR ONCE
Status: COMPLETED | OUTPATIENT
Start: 2021-12-10 | End: 2021-12-10

## 2021-12-10 RX ORDER — CEPHALEXIN 500 MG/1
500 CAPSULE ORAL 4 TIMES DAILY
Qty: 20 CAPSULE | Refills: 0 | Status: SHIPPED | OUTPATIENT
Start: 2021-12-10 | End: 2021-12-15

## 2021-12-10 RX ORDER — CEPHALEXIN 500 MG/1
500 CAPSULE ORAL 4 TIMES DAILY
Qty: 20 CAPSULE | Refills: 0 | Status: SHIPPED | OUTPATIENT
Start: 2021-12-16 | End: 2021-12-21

## 2021-12-10 RX ADMIN — CEFTRIAXONE 1 G: 1 INJECTION, POWDER, FOR SOLUTION INTRAMUSCULAR; INTRAVENOUS at 20:12

## 2021-12-10 RX ADMIN — IOPAMIDOL 53 ML: 755 INJECTION, SOLUTION INTRAVENOUS at 20:37

## 2021-12-10 RX ADMIN — ALUMINUM HYDROXIDE, MAGNESIUM HYDROXIDE, AND SIMETHICONE 30 ML: 200; 200; 20 SUSPENSION ORAL at 21:21

## 2021-12-10 RX ADMIN — OXYCODONE HYDROCHLORIDE 5 MG: 5 TABLET ORAL at 21:21

## 2021-12-10 RX ADMIN — PANTOPRAZOLE SODIUM 40 MG: 40 INJECTION, POWDER, FOR SOLUTION INTRAVENOUS at 21:21

## 2021-12-10 RX ADMIN — ONDANSETRON 4 MG: 2 INJECTION INTRAMUSCULAR; INTRAVENOUS at 19:19

## 2021-12-10 RX ADMIN — SODIUM CHLORIDE 74 ML: 9 INJECTION, SOLUTION INTRAVENOUS at 20:37

## 2021-12-10 RX ADMIN — HYDROMORPHONE HYDROCHLORIDE 0.5 MG: 1 INJECTION, SOLUTION INTRAMUSCULAR; INTRAVENOUS; SUBCUTANEOUS at 19:19

## 2021-12-10 RX ADMIN — SODIUM CHLORIDE 1000 ML: 9 INJECTION, SOLUTION INTRAVENOUS at 19:08

## 2021-12-10 ASSESSMENT — ENCOUNTER SYMPTOMS
DYSURIA: 0
FEVER: 0
VOMITING: 0
ABDOMINAL PAIN: 1

## 2021-12-10 NOTE — TELEPHONE ENCOUNTER
"Mom and pt calls, pt gives verbal constant to talk to mom    Nurse Triage SBAR    Is this a 2nd Level Triage? YES, LICENSED PRACTITIONER REVIEW IS REQUIRED    S-(situation): onset of intense left upper abdomen pain 1 hour ago    B-(background): see triage note, ranges between 8 and 9, similar episode happened 12/7 but resolved after 4 hours, came on suddenly, some nausea, denies fever, vomiting, no known trigger, denies BM issues    A-(assessment): LUQ abdominal pain    R-(recommendations): routed to AG, please review and advise, ROUTE to inform mom of plan        Additional Information    Negative: Passed out (i.e., fainted, collapsed and was not responding)    Negative: Shock suspected (e.g., cold/pale/clammy skin, too weak to stand, low BP, rapid pulse)    Negative: Visible sweat on face or sweat is dripping down    Negative: Chest pain    Answer Assessment - Initial Assessment Questions  1. LOCATION: \"Where does it hurt?\"       Left upper abdominal area, wrapping around to back  2. RADIATION: \"Does the pain shoot anywhere else?\" (e.g., chest, back)      Yes, to back  3. ONSET: \"When did the pain begin?\" (e.g., minutes, hours or days ago)       About one hour ago  4. SUDDEN: \"Gradual or sudden onset?\"      Gradual, very intense after starting   5. PATTERN \"Does the pain come and go, or is it constant?\"     - If constant: \"Is it getting better, staying the same, or worsening?\"       (Note: Constant means the pain never goes away completely; most serious pain is constant and it progresses)   6. SEVERITY: \"How bad is the pain?\"  (e.g., Scale 1-10; mild, moderate, or severe)         - SEVERE (8-10): excruciating pain, doubled over, unable to do any normal activities, can walk       Radiates from 8-9   7. RECURRENT SYMPTOM: \"Have you ever had this type of abdominal pain before?\" If so, ask: \"When was the last time?\" and \"What happened that time?\"       12/7, had to come home, lasted about 4 hours  8. AGGRAVATING " "FACTORS: \"Does anything seem to cause this pain?\" (e.g., foods, stress, alcohol)      I don't know  9. CARDIAC SYMPTOMS: \"Do you have any of the following symptoms: chest pain, difficulty breathing, sweating, nausea?\"      Some nausea  10. OTHER SYMPTOMS: \"Do you have any other symptoms?\" (e.g., fever, vomiting, diarrhea)        No   11. PREGNANCY: \"Is there any chance you are pregnant?\" \"When was your last menstrual period?\"        No    Protocols used: ABDOMINAL PAIN - UPPER-A-OH      "

## 2021-12-10 NOTE — TELEPHONE ENCOUNTER
Discussed with AG, recommends urgent care if pain tolerable, ER if pain severe or if pt needs pain relief,  recommend maalox or Tums trial, called pt/mom back, informed, informs did try Tums and did not help, they will sort things out and proceed for further evaluation if needed  Elisabeth Brice, RN, BSN  Message handled by Nurse Triage with [employer] - provider name: SONNY.

## 2021-12-11 VITALS
OXYGEN SATURATION: 99 % | TEMPERATURE: 98.9 F | SYSTOLIC BLOOD PRESSURE: 105 MMHG | DIASTOLIC BLOOD PRESSURE: 68 MMHG | HEART RATE: 69 BPM | RESPIRATION RATE: 16 BRPM

## 2021-12-11 LAB
ANNOTATION COMMENT IMP: ABNORMAL
RETINYL PALMITATE SERPL-MCNC: 0.06 MG/L
VIT A SERPL-MCNC: 0.28 MG/L

## 2021-12-11 NOTE — ED PROVIDER NOTES
History   Chief Complaint:  Abdominal Pain       HPI   Celina Fam is a 21 year old female with history of Celiac disease who presents with sudden onset left-sided abdominal pain four hours ago. She explained the pain began thirty minutes after eating and is located under her ribs, wrapping around her left side. She also complained of chest pain which she attributed to heart burn. Also noted pain with inspiration. Denied pelvic pain, right-sided abdominal pain, fever, vomiting, dysuria.    Of note, she reported similar abdominal pain a few days ago after going to the gym, preventing her from standing straight. She also noted she had dysuria and frequency a few days ago which subsided after taking cranberry vitamins. Denied history of blood clots, kidney stones, or ovarian cysts. She also reported she recently restarted birth control and is scheduled to have her menstrual period in two days. Two months ago, she had CT, MRI, liver biopsy and discovered liver enzymes elevated. She had blood work done a few days ago with elevated liver enzymes again. She has an appointment with a liver specialist but noted today's pain is different.       Abdominal ct/mri were reviewed from 9/20/21 and liver biopsy was reviewed from 10/14/21  Also reviewed GI note 10/21/21.    10/14/21 CMP:  CMP: AST 63 (H), ALT 86 (H),  o/w WNL (Creatinine 0.75)     Results Abdominal CT 9/20/21:  IMPRESSION: Abnormal heterogeneity of the hepatic attenuation without   evidence of a clear mass. This could represent geographic fatty   deposition, avascular malformation, or hepatitis. Hepatic MRI   recommended.   Reading per radiology    Results MR Liver 9/20/21:  1. There are scattered irregular areas of T2 hyperintensity and T1   hypointensity, most prominent in the left hepatic lobe. The left   hepatic lobe also appears mildly atrophic. Findings suggest areas of   hepatic fibrosis, of uncertain etiology. Hepatic neoplasm is   considered  unlikely from this appearance, however clinical correlation   and follow-up are recommended.   2. No biliary dilatation is identified.   3. Moderate amount of stool throughout the visualized portion of the   colon.   Reading per radiology      Review of Systems   Constitutional: Negative for fever.   Cardiovascular: Positive for chest pain.   Gastrointestinal: Positive for abdominal pain. Negative for vomiting.   Genitourinary: Negative for dysuria.   All other systems reviewed and are negative.        Allergies:  Bactrim [Sulfamethoxazole W/Trimethoprim]    Medications:  Juliana  Aldactone    Past Medical History:     Left hand fracture  Depression  Right radial fracture  Acne  Anxiety  Celiac disease  Milk allergy    Past Surgical History:    T and A  Tympanoplasty  Endoscopy  Biopsy    Social History:  Presents alone.     Physical Exam     Patient Vitals for the past 24 hrs:   BP Temp Temp src Pulse Resp SpO2   12/10/21 2230 115/75 -- -- 80 -- --   12/10/21 2200 114/67 -- -- 74 -- 99 %   12/10/21 2130 107/72 -- -- 73 -- 98 %   12/10/21 2100 120/87 -- -- 83 -- 99 %   12/10/21 2030 105/65 -- -- 74 -- 97 %   12/10/21 1900 123/86 -- -- 82 -- 98 %   12/10/21 1810 -- 98.9  F (37.2  C) Oral -- -- 100 %   12/10/21 1809 127/54 -- -- 63 16 --       Physical Exam  Gen: alert  HEENT: PERRL, oropharynx clear  Neck: normal ROM  CV: RRR, no murmurs  Pulm: breath sounds equal, lungs clear  Abd: Mild tenderness left inferior ribs and left upper quadrant  Back: no evidence of injury, no cva tenderness  MSK: no deformity, moves all extremities  Skin: no rash  Neuro: alert, appropriate conversation and interaction    Emergency Department Course   ECG:  ECG taken at 2203, ECG read at 2211  Normal sinus rhythm  Normal ECG  Rate 68 bpm. TX interval 118 ms. QRS duration 72 ms. QT/QTc 406/431 ms. P-R-T axes 31 68 35.    Imaging:  Abd/pelvis CT no contrast - Stone Protocol   Final Result   IMPRESSION:   1.  No pulmonary embolism.      2.   Geographic regions of hypodensity in the liver corresponding to regions of enhancement on the prior CT. Relative atrophy left hepatic lobe. No significant change since the prior exam.      3.  No urinary calculi or hydronephrosis.      4.  Normal appendix.      CT Chest Pulmonary Embolism w Contrast   Final Result   IMPRESSION:   1.  No pulmonary embolism.      2.  Geographic regions of hypodensity in the liver corresponding to regions of enhancement on the prior CT. Relative atrophy left hepatic lobe. No significant change since the prior exam.      3.  No urinary calculi or hydronephrosis.      4.  Normal appendix.        Report per radiology    Laboratory:  Labs Ordered and Resulted from Time of ED Arrival to Time of ED Departure   CBC WITH PLATELETS - Abnormal       Result Value    WBC Count 5.3      RBC Count 5.16      Hemoglobin 13.7      Hematocrit 42.9      MCV 83      MCH 26.6      MCHC 31.9      RDW 16.0 (*)     Platelet Count 252     BASIC METABOLIC PANEL - Abnormal    Sodium 137      Potassium 3.8      Chloride 107      Carbon Dioxide (CO2) 24      Anion Gap 6      Urea Nitrogen 6 (*)     Creatinine 0.68      Calcium 8.9      Glucose 93      GFR Estimate >90     ROUTINE UA WITH MICROSCOPIC - Abnormal    Color Urine Yellow      Appearance Urine Slightly Cloudy (*)     Glucose Urine Negative      Bilirubin Urine Negative      Ketones Urine Negative      Specific Gravity Urine 1.016      Blood Urine Large (*)     pH Urine 5.5      Protein Albumin Urine 70  (*)     Urobilinogen Urine Normal      Nitrite Urine Negative      Leukocyte Esterase Urine Large (*)     Bacteria Urine Few (*)     Mucus Urine Present (*)     RBC Urine >182 (*)     WBC Urine >182 (*)     Squamous Epithelials Urine 1     HEPATIC FUNCTION PANEL - Abnormal    Bilirubin Total 0.4      Bilirubin Direct 0.1      Protein Total 7.0      Albumin 3.2 (*)     Alkaline Phosphatase 86       (*)      (*)    D DIMER QUANTITATIVE -  Abnormal    D-Dimer Quantitative 0.85 (*)    HCG QUALITATIVE PREGNANCY - Normal    hCG Serum Qualitative Negative     HCG QUALITATIVE URINE - Normal    hCG Urine Qualitative Negative     TROPONIN I - Normal    Troponin I High Sensitivity 8     LIPASE - Normal    Lipase 76     URINE CULTURE        Emergency Department Course:    Reviewed:  I reviewed nursing notes, vitals, past medical history and Care Everywhere    Assessments:  1856 I obtained history and examined the patient as noted above.   2000 I rechecked the patient and explained findings. She feels much better.    2120 I rechecked the patient. She is complaining of pain again and I discussed her results.  2234 I rechecked the patient. She feels better and tolerates PO. She wants to go home.     Interventions:  1908 NS 1L IV  1919 Zofran 4 mg IV  1919 Dilaudid 0.5 mg IV  2012 Rocephin 1g IV  2121 Protonix 40 mg IV  2121 Roxicodone 5 mg PO  2121 Maalox 30 ml PO    Disposition:  The patient was discharged to home.     Impression & Plan     Medical Decision Making:   Celina Fam is a 21 year old female who presents for evaluation of abdominal and left flank pain urinalysis is consistent with a urinary tract infection; given the systemic symptoms present, signs and symptoms consistent with pyelonephritis.   There is no clinical evidence of perinephric abscess, emphysematous pyelonephritis, ureterolithiasis, appendicitis, colitis, diverticulitis or any intraabdominal catastrophe.  I also considered pulmonary embolism given that she has recently restarted birth control, the sudden onset of her pain and some pain with respiration.  There is no tachycardia or hypoxia here.  CT pulmonary embolism protocol of the chest was obtained and negative for PE.  Patient symptoms were improved with the above interventions.  Discussed ongoing symptom management at home.  UTI precautions discussed.   Given well appearance, I believe the risk of imminent deterioration is low  enough that outpatient management is indicated.  Return if increasing pain, vomiting, fever, or inability to tolerate the oral antibiotic.  Follow up with primary physician is indicated early next week.    Diagnosis:    ICD-10-CM    1. Flank pain  R10.9    2. Pyelonephritis  N12        Discharge Medications:  New Prescriptions    CEPHALEXIN (KEFLEX) 500 MG CAPSULE    Take 1 capsule (500 mg) by mouth 4 times daily for 5 days    CEPHALEXIN (KEFLEX) 500 MG CAPSULE    Take 1 capsule (500 mg) by mouth 4 times daily for 5 days    ONDANSETRON (ZOFRAN-ODT) 4 MG ODT TAB    Take 1 tablet (4 mg) by mouth every 8 hours as needed for nausea    OXYCODONE (ROXICODONE) 5 MG TABLET    Take 1 tablet (5 mg) by mouth every 6 hours as needed for severe pain       Scribe Disclosure:  Sapphire NELSON, am serving as a scribe at 6:56 PM on 12/10/2021 to document services personally performed by Sepideh Thompson MD based on my observations and the provider's statements to me.              Sepideh Thompson MD  12/10/21 [id number]

## 2021-12-11 NOTE — ED TRIAGE NOTES
Pt presents to ED with c/o severe LLQ abdominal pain. Pt states that pain started 4 hours ago. Pt is visibly uncomfortable. Denies vomiting, diarrhea. Pt reports that she had a similar pain on Tuesday that resolved on its own. ABC intact.

## 2021-12-13 LAB
ATRIAL RATE - MUSE: 68 BPM
BACTERIA UR CULT: NORMAL
DIASTOLIC BLOOD PRESSURE - MUSE: NORMAL MMHG
INTERPRETATION ECG - MUSE: NORMAL
P AXIS - MUSE: 31 DEGREES
PR INTERVAL - MUSE: 118 MS
QRS DURATION - MUSE: 72 MS
QT - MUSE: 406 MS
QTC - MUSE: 431 MS
R AXIS - MUSE: 68 DEGREES
SYSTOLIC BLOOD PRESSURE - MUSE: NORMAL MMHG
T AXIS - MUSE: 35 DEGREES
VENTRICULAR RATE- MUSE: 68 BPM

## 2021-12-13 NOTE — RESULT ENCOUNTER NOTE
Final urine culture report is negative.  Adult Negative Urine culture parameters per protocol: Any # Urogenital single or mixed organism, <10,000 col/ml single organism (cath/midstream), and > 3 organisms (No susceptibilities performed).  [facility] Dept discharge antibiotic prescribed (If applicable): Cephalexin  Treatment recommendations per [facility] ED Lab Result Urine Culture protocol.

## 2021-12-23 ENCOUNTER — LAB (OUTPATIENT)
Dept: LAB | Facility: CLINIC | Age: 21
End: 2021-12-23
Payer: COMMERCIAL

## 2021-12-23 DIAGNOSIS — L70.0 ACNE VULGARIS: ICD-10-CM

## 2021-12-23 LAB
ALBUMIN SERPL-MCNC: 3.7 G/DL (ref 3.4–5)
ALP SERPL-CCNC: 91 U/L (ref 40–150)
ALT SERPL W P-5'-P-CCNC: 162 U/L (ref 0–50)
ANION GAP SERPL CALCULATED.3IONS-SCNC: 3 MMOL/L (ref 3–14)
AST SERPL W P-5'-P-CCNC: 111 U/L (ref 0–45)
BILIRUB SERPL-MCNC: 0.5 MG/DL (ref 0.2–1.3)
BUN SERPL-MCNC: 10 MG/DL (ref 7–30)
CALCIUM SERPL-MCNC: 9.3 MG/DL (ref 8.5–10.1)
CHLORIDE BLD-SCNC: 106 MMOL/L (ref 94–109)
CO2 SERPL-SCNC: 28 MMOL/L (ref 20–32)
CREAT SERPL-MCNC: 0.71 MG/DL (ref 0.52–1.04)
GFR SERPL CREATININE-BSD FRML MDRD: >90 ML/MIN/1.73M2
GLUCOSE BLD-MCNC: 98 MG/DL (ref 70–99)
POTASSIUM BLD-SCNC: 4.1 MMOL/L (ref 3.4–5.3)
PROT SERPL-MCNC: 7.1 G/DL (ref 6.8–8.8)
SODIUM SERPL-SCNC: 137 MMOL/L (ref 133–144)

## 2021-12-23 PROCEDURE — 36415 COLL VENOUS BLD VENIPUNCTURE: CPT

## 2021-12-23 PROCEDURE — 80053 COMPREHEN METABOLIC PANEL: CPT

## 2022-01-04 ENCOUNTER — E-VISIT (OUTPATIENT)
Dept: FAMILY MEDICINE | Facility: CLINIC | Age: 22
End: 2022-01-04
Payer: COMMERCIAL

## 2022-01-04 DIAGNOSIS — R05.9 COUGH: ICD-10-CM

## 2022-01-04 DIAGNOSIS — R52 BODY ACHES: Primary | ICD-10-CM

## 2022-01-04 DIAGNOSIS — Z20.822 EXPOSURE TO 2019 NOVEL CORONAVIRUS: ICD-10-CM

## 2022-01-04 PROCEDURE — 99421 OL DIG E/M SVC 5-10 MIN: CPT | Performed by: NURSE PRACTITIONER

## 2022-01-10 ENCOUNTER — MYC MEDICAL ADVICE (OUTPATIENT)
Dept: FAMILY MEDICINE | Facility: CLINIC | Age: 22
End: 2022-01-10
Payer: COMMERCIAL

## 2022-01-10 DIAGNOSIS — L70.0 ACNE VULGARIS: Primary | ICD-10-CM

## 2022-01-11 ENCOUNTER — LAB (OUTPATIENT)
Dept: LAB | Facility: CLINIC | Age: 22
End: 2022-01-11
Payer: COMMERCIAL

## 2022-01-11 DIAGNOSIS — L70.0 ACNE VULGARIS: ICD-10-CM

## 2022-01-11 LAB
ALBUMIN SERPL-MCNC: 3.3 G/DL (ref 3.4–5)
ALP SERPL-CCNC: 72 U/L (ref 40–150)
ALT SERPL W P-5'-P-CCNC: 113 U/L (ref 0–50)
ANION GAP SERPL CALCULATED.3IONS-SCNC: 6 MMOL/L (ref 3–14)
AST SERPL W P-5'-P-CCNC: 84 U/L (ref 0–45)
BILIRUB SERPL-MCNC: 1.2 MG/DL (ref 0.2–1.3)
BUN SERPL-MCNC: 7 MG/DL (ref 7–30)
CALCIUM SERPL-MCNC: 8.5 MG/DL (ref 8.5–10.1)
CHLORIDE BLD-SCNC: 106 MMOL/L (ref 94–109)
CO2 SERPL-SCNC: 25 MMOL/L (ref 20–32)
CREAT SERPL-MCNC: 0.84 MG/DL (ref 0.52–1.04)
GFR SERPL CREATININE-BSD FRML MDRD: >90 ML/MIN/1.73M2
GLUCOSE BLD-MCNC: 132 MG/DL (ref 70–99)
POTASSIUM BLD-SCNC: 3.8 MMOL/L (ref 3.4–5.3)
PROT SERPL-MCNC: 6.8 G/DL (ref 6.8–8.8)
SODIUM SERPL-SCNC: 137 MMOL/L (ref 133–144)

## 2022-01-11 PROCEDURE — 80053 COMPREHEN METABOLIC PANEL: CPT

## 2022-01-11 PROCEDURE — 36415 COLL VENOUS BLD VENIPUNCTURE: CPT

## 2022-01-11 RX ORDER — SPIRONOLACTONE 25 MG/1
25 TABLET ORAL DAILY
Qty: 90 TABLET | Refills: 0 | Status: SHIPPED | OUTPATIENT
Start: 2022-01-11 | End: 2022-05-16

## 2022-01-12 ENCOUNTER — ALLIED HEALTH/NURSE VISIT (OUTPATIENT)
Dept: FAMILY MEDICINE | Facility: CLINIC | Age: 22
End: 2022-01-12

## 2022-01-12 VITALS — DIASTOLIC BLOOD PRESSURE: 62 MMHG | SYSTOLIC BLOOD PRESSURE: 98 MMHG

## 2022-01-12 DIAGNOSIS — Z01.30 BLOOD PRESSURE CHECK: Primary | ICD-10-CM

## 2022-01-12 PROCEDURE — 99207 PR NO CHARGE NURSE ONLY: CPT

## 2022-02-08 ENCOUNTER — TELEPHONE (OUTPATIENT)
Dept: FAMILY MEDICINE | Facility: CLINIC | Age: 22
End: 2022-02-08
Payer: COMMERCIAL

## 2022-02-08 DIAGNOSIS — R74.8 ELEVATED LIVER ENZYMES: Primary | ICD-10-CM

## 2022-02-16 DIAGNOSIS — L70.9 ACNE, UNSPECIFIED ACNE TYPE: ICD-10-CM

## 2022-02-16 RX ORDER — ETHINYL ESTRADIOL/DROSPIRENONE 0.02-3(28)
1 TABLET ORAL DAILY
Qty: 84 TABLET | Refills: 3 | Status: SHIPPED | OUTPATIENT
Start: 2022-02-16 | End: 2022-06-17

## 2022-02-18 ENCOUNTER — LAB (OUTPATIENT)
Dept: LAB | Facility: CLINIC | Age: 22
End: 2022-02-18
Payer: COMMERCIAL

## 2022-02-18 DIAGNOSIS — R74.8 ELEVATED LIVER ENZYMES: ICD-10-CM

## 2022-02-18 PROCEDURE — 80053 COMPREHEN METABOLIC PANEL: CPT

## 2022-02-18 PROCEDURE — 36415 COLL VENOUS BLD VENIPUNCTURE: CPT

## 2022-02-19 LAB
ALBUMIN SERPL-MCNC: 3.4 G/DL (ref 3.4–5)
ALP SERPL-CCNC: 66 U/L (ref 40–150)
ALT SERPL W P-5'-P-CCNC: 86 U/L (ref 0–50)
ANION GAP SERPL CALCULATED.3IONS-SCNC: 9 MMOL/L (ref 3–14)
AST SERPL W P-5'-P-CCNC: 53 U/L (ref 0–45)
BILIRUB SERPL-MCNC: 0.7 MG/DL (ref 0.2–1.3)
BUN SERPL-MCNC: 9 MG/DL (ref 7–30)
CALCIUM SERPL-MCNC: 9 MG/DL (ref 8.5–10.1)
CHLORIDE BLD-SCNC: 108 MMOL/L (ref 94–109)
CO2 SERPL-SCNC: 22 MMOL/L (ref 20–32)
CREAT SERPL-MCNC: 0.78 MG/DL (ref 0.52–1.04)
GFR SERPL CREATININE-BSD FRML MDRD: >90 ML/MIN/1.73M2
GLUCOSE BLD-MCNC: 95 MG/DL (ref 70–99)
POTASSIUM BLD-SCNC: 4.5 MMOL/L (ref 3.4–5.3)
PROT SERPL-MCNC: 6.9 G/DL (ref 6.8–8.8)
SODIUM SERPL-SCNC: 139 MMOL/L (ref 133–144)

## 2022-03-05 ENCOUNTER — HEALTH MAINTENANCE LETTER (OUTPATIENT)
Age: 22
End: 2022-03-05

## 2022-05-12 ENCOUNTER — TELEPHONE (OUTPATIENT)
Dept: FAMILY MEDICINE | Facility: CLINIC | Age: 22
End: 2022-05-12
Payer: COMMERCIAL

## 2022-05-12 DIAGNOSIS — L70.0 ACNE VULGARIS: ICD-10-CM

## 2022-05-12 NOTE — TELEPHONE ENCOUNTER
Called pt and left voicemail to call back to clarify and confirm dose of refill request. Is another provider prescribing this for her? 100mg dose of spironolactone is not on active med list or listed historically.    Blanche Magana RN

## 2022-05-12 NOTE — TELEPHONE ENCOUNTER
Received call back from patient. Patient states dose was previously increased from 25mg dose to 100mg dose and prescribed last by her dermatologist. Patient will contact dermatology for refill or if dermatology is requesting PCP take over script - to have office visit notes from dermatology faxed to [facility] to update our records.     Andriy MCPHERSON RN

## 2022-05-12 NOTE — TELEPHONE ENCOUNTER
Requesting new rx for:    Spironolactone 100mg  1QHS.    This was the most recent rx we had on file but I didn't see this strength on the active med list. Please verify and send new rx.     [facility]  [phone]

## 2022-05-16 RX ORDER — SPIRONOLACTONE 25 MG/1
25 TABLET ORAL DAILY
Qty: 90 TABLET | Refills: 0 | Status: SHIPPED | OUTPATIENT
Start: 2022-05-16 | End: 2022-05-17

## 2022-05-16 NOTE — TELEPHONE ENCOUNTER
Routing refill request to provider for review/approval because:  Ok for ongoing refills?  Elisabeth Brice RN, BSN  [facility]

## 2022-05-17 RX ORDER — SPIRONOLACTONE 100 MG/1
100 TABLET, FILM COATED ORAL
COMMUNITY
Start: 2022-02-16 | End: 2022-06-17

## 2022-05-20 ENCOUNTER — TELEPHONE (OUTPATIENT)
Dept: FAMILY MEDICINE | Facility: CLINIC | Age: 22
End: 2022-05-20

## 2022-05-20 NOTE — TELEPHONE ENCOUNTER
Patient Quality Outreach    Patient is due for the following:   Chlamydia    NEXT STEPS:   Patient declined follow up at this time.    Type of outreach:    Chart review performed, no outreach needed.      Questions for provider review:    None     Penny Schwartz

## 2022-06-17 ENCOUNTER — OFFICE VISIT (OUTPATIENT)
Dept: FAMILY MEDICINE | Facility: CLINIC | Age: 22
End: 2022-06-17
Payer: COMMERCIAL

## 2022-06-17 VITALS
SYSTOLIC BLOOD PRESSURE: 111 MMHG | HEART RATE: 77 BPM | WEIGHT: 131.1 LBS | OXYGEN SATURATION: 97 % | HEIGHT: 61 IN | DIASTOLIC BLOOD PRESSURE: 70 MMHG | BODY MASS INDEX: 24.75 KG/M2

## 2022-06-17 DIAGNOSIS — K90.0 CELIAC DISEASE: ICD-10-CM

## 2022-06-17 DIAGNOSIS — L70.9 ACNE, UNSPECIFIED ACNE TYPE: ICD-10-CM

## 2022-06-17 DIAGNOSIS — R74.8 ELEVATED LIVER ENZYMES: ICD-10-CM

## 2022-06-17 DIAGNOSIS — Z00.00 ROUTINE GENERAL MEDICAL EXAMINATION AT A HEALTH CARE FACILITY: Primary | ICD-10-CM

## 2022-06-17 PROCEDURE — 80053 COMPREHEN METABOLIC PANEL: CPT | Performed by: NURSE PRACTITIONER

## 2022-06-17 PROCEDURE — 36415 COLL VENOUS BLD VENIPUNCTURE: CPT | Performed by: NURSE PRACTITIONER

## 2022-06-17 PROCEDURE — 99395 PREV VISIT EST AGE 18-39: CPT | Performed by: NURSE PRACTITIONER

## 2022-06-17 RX ORDER — SPIRONOLACTONE 100 MG/1
100 TABLET, FILM COATED ORAL DAILY
Qty: 90 TABLET | Refills: 3 | Status: SHIPPED | OUTPATIENT
Start: 2022-06-17 | End: 2023-02-01

## 2022-06-17 RX ORDER — FAMOTIDINE 20 MG
TABLET ORAL
COMMUNITY
End: 2024-04-15

## 2022-06-17 RX ORDER — OMEGA-3 FATTY ACIDS/FISH OIL 300-1000MG
CAPSULE ORAL
COMMUNITY
End: 2024-04-15

## 2022-06-17 RX ORDER — ETHINYL ESTRADIOL/DROSPIRENONE 0.02-3(28)
1 TABLET ORAL DAILY
Qty: 84 TABLET | Refills: 3 | Status: SHIPPED | OUTPATIENT
Start: 2022-06-17 | End: 2023-02-01

## 2022-06-17 RX ORDER — METRONIDAZOLE 7.5 MG/G
GEL TOPICAL 2 TIMES DAILY
COMMUNITY
End: 2024-04-15

## 2022-06-17 SDOH — ECONOMIC STABILITY: FOOD INSECURITY: WITHIN THE PAST 12 MONTHS, YOU WORRIED THAT YOUR FOOD WOULD RUN OUT BEFORE YOU GOT MONEY TO BUY MORE.: NEVER TRUE

## 2022-06-17 SDOH — HEALTH STABILITY: PHYSICAL HEALTH: ON AVERAGE, HOW MANY DAYS PER WEEK DO YOU ENGAGE IN MODERATE TO STRENUOUS EXERCISE (LIKE A BRISK WALK)?: 5 DAYS

## 2022-06-17 SDOH — ECONOMIC STABILITY: TRANSPORTATION INSECURITY
IN THE PAST 12 MONTHS, HAS LACK OF TRANSPORTATION KEPT YOU FROM MEETINGS, WORK, OR FROM GETTING THINGS NEEDED FOR DAILY LIVING?: NO

## 2022-06-17 SDOH — ECONOMIC STABILITY: TRANSPORTATION INSECURITY
IN THE PAST 12 MONTHS, HAS THE LACK OF TRANSPORTATION KEPT YOU FROM MEDICAL APPOINTMENTS OR FROM GETTING MEDICATIONS?: NO

## 2022-06-17 SDOH — ECONOMIC STABILITY: INCOME INSECURITY: HOW HARD IS IT FOR YOU TO PAY FOR THE VERY BASICS LIKE FOOD, HOUSING, MEDICAL CARE, AND HEATING?: NOT VERY HARD

## 2022-06-17 SDOH — HEALTH STABILITY: PHYSICAL HEALTH: ON AVERAGE, HOW MANY MINUTES DO YOU ENGAGE IN EXERCISE AT THIS LEVEL?: 60 MIN

## 2022-06-17 SDOH — ECONOMIC STABILITY: INCOME INSECURITY: IN THE LAST 12 MONTHS, WAS THERE A TIME WHEN YOU WERE NOT ABLE TO PAY THE MORTGAGE OR RENT ON TIME?: NO

## 2022-06-17 SDOH — ECONOMIC STABILITY: FOOD INSECURITY: WITHIN THE PAST 12 MONTHS, THE FOOD YOU BOUGHT JUST DIDN'T LAST AND YOU DIDN'T HAVE MONEY TO GET MORE.: NEVER TRUE

## 2022-06-17 ASSESSMENT — ENCOUNTER SYMPTOMS
WEAKNESS: 0
ABDOMINAL PAIN: 0
ARTHRALGIAS: 0
CONSTIPATION: 0
DYSURIA: 0
NAUSEA: 0
HEMATURIA: 0
SHORTNESS OF BREATH: 0
COUGH: 0
NERVOUS/ANXIOUS: 0
SORE THROAT: 0
BREAST MASS: 0
HEARTBURN: 0
EYE PAIN: 0
PALPITATIONS: 0
HEADACHES: 0
HEMATOCHEZIA: 0
PARESTHESIAS: 0
JOINT SWELLING: 0
DIZZINESS: 0
FEVER: 0
FREQUENCY: 0
DIARRHEA: 0
MYALGIAS: 0
CHILLS: 0

## 2022-06-17 ASSESSMENT — SOCIAL DETERMINANTS OF HEALTH (SDOH)
ARE YOU MARRIED, WIDOWED, DIVORCED, SEPARATED, NEVER MARRIED, OR LIVING WITH A PARTNER?: NEVER MARRIED
IN A TYPICAL WEEK, HOW MANY TIMES DO YOU TALK ON THE PHONE WITH FAMILY, FRIENDS, OR NEIGHBORS?: MORE THAN THREE TIMES A WEEK
HOW OFTEN DO YOU ATTEND CHURCH OR RELIGIOUS SERVICES?: NEVER
HOW OFTEN DO YOU GET TOGETHER WITH FRIENDS OR RELATIVES?: MORE THAN THREE TIMES A WEEK
DO YOU BELONG TO ANY CLUBS OR ORGANIZATIONS SUCH AS CHURCH GROUPS UNIONS, FRATERNAL OR ATHLETIC GROUPS, OR SCHOOL GROUPS?: NO

## 2022-06-17 ASSESSMENT — ANXIETY QUESTIONNAIRES
GAD7 TOTAL SCORE: 4
6. BECOMING EASILY ANNOYED OR IRRITABLE: NOT AT ALL
8. IF YOU CHECKED OFF ANY PROBLEMS, HOW DIFFICULT HAVE THESE MADE IT FOR YOU TO DO YOUR WORK, TAKE CARE OF THINGS AT HOME, OR GET ALONG WITH OTHER PEOPLE?: SOMEWHAT DIFFICULT
1. FEELING NERVOUS, ANXIOUS, OR ON EDGE: SEVERAL DAYS
GAD7 TOTAL SCORE: 4
GAD7 TOTAL SCORE: 4
7. FEELING AFRAID AS IF SOMETHING AWFUL MIGHT HAPPEN: NOT AT ALL
3. WORRYING TOO MUCH ABOUT DIFFERENT THINGS: SEVERAL DAYS
2. NOT BEING ABLE TO STOP OR CONTROL WORRYING: NOT AT ALL
4. TROUBLE RELAXING: SEVERAL DAYS
5. BEING SO RESTLESS THAT IT IS HARD TO SIT STILL: SEVERAL DAYS
7. FEELING AFRAID AS IF SOMETHING AWFUL MIGHT HAPPEN: NOT AT ALL

## 2022-06-17 ASSESSMENT — LIFESTYLE VARIABLES
AUDIT-C TOTAL SCORE: 6
SKIP TO QUESTIONS 9-10: 0
HOW OFTEN DO YOU HAVE A DRINK CONTAINING ALCOHOL: 2-3 TIMES A WEEK
HOW OFTEN DO YOU HAVE SIX OR MORE DRINKS ON ONE OCCASION: MONTHLY
HOW MANY STANDARD DRINKS CONTAINING ALCOHOL DO YOU HAVE ON A TYPICAL DAY: 3 OR 4

## 2022-06-17 NOTE — PROGRESS NOTES
SUBJECTIVE:   CC: Celina Fam is an 22 year old woman who presents for preventive health visit.       Patient has been advised of split billing requirements and indicates understanding: Yes  Healthy Habits:     Getting at least 3 servings of Calcium per day:  Yes    Bi-annual eye exam:  NO    Dental care twice a year:  Yes    Sleep apnea or symptoms of sleep apnea:  Daytime drowsiness    Diet:  Vegetarian/vegan and Gluten-free/reduced    Frequency of exercise:  4-5 days/week    Duration of exercise:  Greater than 60 minutes    Taking medications regularly:  Yes    Medication side effects:  None    PHQ-2 Total Score: 0    Additional concerns today:  Yes (would like liver enzymes checked)    Periods regular and manageable.   No new partners.     Today's PHQ-2 Score:   PHQ-2 ( 1999 Pf[employer]) 6/17/2022   Q1: Little interest or pleasure in doing things 0   Q2: Feeling down, depressed or hopeless 0   PHQ-2 Score 0   PHQ-2 Total Score (12-17 Years)- Positive if 3 or more points; Administer PHQ-A if positive -   Q1: Little interest or pleasure in doing things Not at all   Q2: Feeling down, depressed or hopeless Not at all   PHQ-2 Score 0       Abuse: Current or Past (Physical, Sexual or Emotional) - No  Do you feel safe in your environment? Yes    Have you ever done Advance Care Planning? (For example, a Health Directive, POLST, or a discussion with a medical provider or your loved ones about your wishes): No, advance care planning information given to patient to review.  Patient declined advance care planning discussion at this time.    Social History     Tobacco Use     Smoking status: Never Smoker     Smokeless tobacco: Never Used   Substance Use Topics     Alcohol use: Yes     Comment: On the weekends   Answers for HPI/ROS submitted by the patient on 6/17/2022  If you checked off any problems, how difficult have these problems made it for you to do your work, take care of things at home, or get along with other  people?: Somewhat difficult  PHQ9 TOTAL SCORE: 3  HALI 7 TOTAL SCORE: 4        Alcohol Use 6/17/2022   Prescreen: >3 drinks/day or >7 drinks/week? No   Prescreen: >3 drinks/day or >7 drinks/week? -     Reviewed orders with patient.  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   HENT: Negative for congestion, ear pain, hearing loss and sore throat.    Eyes: Negative for pain and visual disturbance.   Respiratory: Negative for cough and shortness of breath.    Cardiovascular: Negative for chest pain, palpitations and peripheral edema.   Gastrointestinal: Negative for abdominal pain, constipation, diarrhea, heartburn, hematochezia and nausea.   Breasts:  Negative for tenderness, breast mass and discharge.   Genitourinary: Negative for dysuria, frequency, genital sores, hematuria, pelvic pain, urgency, vaginal bleeding and vaginal discharge.   Musculoskeletal: Negative for arthralgias, joint swelling and myalgias.   Skin: Negative for rash.   Neurological: Negative for dizziness, weakness, headaches and paresthesias.   Psychiatric/Behavioral: Negative for mood changes. The patient is not nervous/anxious.           OBJECTIVE:   BP  "111/70 (BP Location: Right arm, Patient Position: Sitting, Cuff Size: Adult Regular)   Pulse 77   Ht 1.549 m (5' 1\")   Wt 59.5 kg (131 lb 1.6 oz)   SpO2 97%   BMI 24.77 kg/m    Physical Exam  GENERAL: healthy, alert and no distress  EYES: Eyes grossly normal to inspection, PERRL and conjunctivae and sclerae normal  HENT: ear canals and TM's normal, nose and mouth without ulcers or lesions  NECK: no adenopathy, no asymmetry, masses, or scars and thyroid normal to palpation  RESP: lungs clear to auscultation - no rales, rhonchi or wheezes  BREAST: defers  CV: regular rate and rhythm, normal S1 S2, no S3 or S4, no murmur, click or rub, no peripheral edema and peripheral pulses strong  ABDOMEN: soft, nontender, no hepatosplenomegaly, no masses and bowel sounds normal  MS: no gross musculoskeletal defects noted, no edema  SKIN: no suspicious lesions or rashes  NEURO: Normal strength and tone, mentation intact and speech normal  PSYCH: mentation appears normal, affect normal/bright    Diagnostic Test Results:  Labs reviewed in Epic    ASSESSMENT/PLAN:   Celina was seen today for physical.    Diagnoses and all orders for this visit:    Routine general medical examination at a health care facility  Defers COVID vaccine.     Elevated liver enzymes  -     Comprehensive metabolic panel (BMP + Alb, Alk Phos, ALT, AST, Total. Bili, TP); Future  -     Comprehensive metabolic panel (BMP + Alb, Alk Phos, ALT, AST, Total. Bili, TP)  Followed with Gastroenterology. LFT's have normalized.   Would recommend supplement use if enzymes are outside of normal range.     Celiac disease  Follows Gluten Free diet.     Acne, unspecified acne type  -     ADRIÁN 3-0.02 MG tablet; Take 1 tablet by mouth daily  -     spironolactone (ALDACTONE) 100 MG tablet; Take 1 tablet (100 mg) by mouth daily  Well controlled. Continued OCP and spirolactone.       Patient has been advised of split billing requirements and indicates understanding: " "Yes    COUNSELING:  Reviewed preventive health counseling, as reflected in patient instructions    Estimated body mass index is 24.77 kg/m  as calculated from the following:    Height as of this encounter: 1.549 m (5' 1\").    Weight as of this encounter: 59.5 kg (131 lb 1.6 oz).        She reports that she has never smoked. She has never used smokeless tobacco.      Counseling Resources:  ATP IV Guidelines  Pooled Cohorts Equation Calculator  Breast Cancer Risk Calculator  BRCA-Related Cancer Risk Assessment: FHS-7 Tool  FRAX Risk Assessment  ICSI Preventive Guidelines  Dietary Guidelines for Americans, 2010  USDA's MyPlate  ASA Prophylaxis  Lung CA Screening    VANESA Valerio CNP  [employer] [facility]  "

## 2022-06-18 LAB
ALBUMIN SERPL-MCNC: 3.8 G/DL (ref 3.4–5)
ALP SERPL-CCNC: 62 U/L (ref 40–150)
ALT SERPL W P-5'-P-CCNC: 35 U/L (ref 0–50)
ANION GAP SERPL CALCULATED.3IONS-SCNC: 2 MMOL/L (ref 3–14)
AST SERPL W P-5'-P-CCNC: 30 U/L (ref 0–45)
BILIRUB SERPL-MCNC: 0.5 MG/DL (ref 0.2–1.3)
BUN SERPL-MCNC: 7 MG/DL (ref 7–30)
CALCIUM SERPL-MCNC: 9.1 MG/DL (ref 8.5–10.1)
CHLORIDE BLD-SCNC: 108 MMOL/L (ref 94–109)
CO2 SERPL-SCNC: 28 MMOL/L (ref 20–32)
CREAT SERPL-MCNC: 0.59 MG/DL (ref 0.52–1.04)
GFR SERPL CREATININE-BSD FRML MDRD: >90 ML/MIN/1.73M2
GLUCOSE BLD-MCNC: 87 MG/DL (ref 70–99)
POTASSIUM BLD-SCNC: 4.3 MMOL/L (ref 3.4–5.3)
PROT SERPL-MCNC: 7 G/DL (ref 6.8–8.8)
SODIUM SERPL-SCNC: 138 MMOL/L (ref 133–144)

## 2022-06-19 ENCOUNTER — MYC MEDICAL ADVICE (OUTPATIENT)
Dept: FAMILY MEDICINE | Facility: CLINIC | Age: 22
End: 2022-06-19
Payer: COMMERCIAL

## 2022-06-19 NOTE — LETTER
June 20, 2022      RE: Celina Fam         [address] [place] [address]        To Whom It May Concern,     Celina Fam is under my professional care and, for medical reasons, requires the use of a  animal.      Sincerely,        VANESA Valerio CNP

## 2022-06-29 ENCOUNTER — MYC MEDICAL ADVICE (OUTPATIENT)
Dept: FAMILY MEDICINE | Facility: CLINIC | Age: 22
End: 2022-06-29

## 2022-06-29 ENCOUNTER — ALLIED HEALTH/NURSE VISIT (OUTPATIENT)
Dept: FAMILY MEDICINE | Facility: CLINIC | Age: 22
End: 2022-06-29
Payer: COMMERCIAL

## 2022-06-29 DIAGNOSIS — Z23 NEED FOR COVID-19 VACCINE: Primary | ICD-10-CM

## 2022-06-29 PROCEDURE — 91301 COVID-19,PF,MODERNA (18+ YRS PRIMARY SERIES .5ML): CPT

## 2022-06-29 PROCEDURE — 0011A COVID-19,PF,MODERNA (18+ YRS PRIMARY SERIES .5ML): CPT

## 2022-06-29 PROCEDURE — 99207 PR NO CHARGE NURSE ONLY: CPT

## 2022-06-29 NOTE — TELEPHONE ENCOUNTER
See Emair message, routed to AG, please review/advise/inform pt of plan  Elisabeth Brice RN, BSN  [facility]

## 2022-11-20 ENCOUNTER — HEALTH MAINTENANCE LETTER (OUTPATIENT)
Age: 22
End: 2022-11-20

## 2023-02-01 ENCOUNTER — OFFICE VISIT (OUTPATIENT)
Dept: FAMILY MEDICINE | Facility: CLINIC | Age: 23
End: 2023-02-01
Payer: COMMERCIAL

## 2023-02-01 VITALS
TEMPERATURE: 98.3 F | HEART RATE: 77 BPM | RESPIRATION RATE: 20 BRPM | OXYGEN SATURATION: 100 % | DIASTOLIC BLOOD PRESSURE: 80 MMHG | SYSTOLIC BLOOD PRESSURE: 122 MMHG | HEIGHT: 62 IN | WEIGHT: 128 LBS | BODY MASS INDEX: 23.55 KG/M2

## 2023-02-01 DIAGNOSIS — Z78.9 VEGETARIAN DIET: ICD-10-CM

## 2023-02-01 DIAGNOSIS — L70.9 ACNE, UNSPECIFIED ACNE TYPE: Primary | ICD-10-CM

## 2023-02-01 DIAGNOSIS — K90.0 CELIAC DISEASE: ICD-10-CM

## 2023-02-01 LAB
ALBUMIN SERPL BCG-MCNC: 4.1 G/DL (ref 3.5–5.2)
ALP SERPL-CCNC: 41 U/L (ref 35–104)
ALT SERPL W P-5'-P-CCNC: 26 U/L (ref 10–35)
ANION GAP SERPL CALCULATED.3IONS-SCNC: 10 MMOL/L (ref 7–15)
AST SERPL W P-5'-P-CCNC: 28 U/L (ref 10–35)
BILIRUB SERPL-MCNC: 0.4 MG/DL
BUN SERPL-MCNC: 8.6 MG/DL (ref 6–20)
CALCIUM SERPL-MCNC: 9.2 MG/DL (ref 8.6–10)
CHLORIDE SERPL-SCNC: 106 MMOL/L (ref 98–107)
CREAT SERPL-MCNC: 0.81 MG/DL (ref 0.51–0.95)
DEPRECATED HCO3 PLAS-SCNC: 24 MMOL/L (ref 22–29)
ERYTHROCYTE [DISTWIDTH] IN BLOOD BY AUTOMATED COUNT: 12.2 % (ref 10–15)
FERRITIN SERPL-MCNC: 18 NG/ML (ref 6–175)
GFR SERPL CREATININE-BSD FRML MDRD: >90 ML/MIN/1.73M2
GLUCOSE SERPL-MCNC: 106 MG/DL (ref 70–99)
HCT VFR BLD AUTO: 40.8 % (ref 35–47)
HGB BLD-MCNC: 13.7 G/DL (ref 11.7–15.7)
IRON BINDING CAPACITY (ROCHE): 373 UG/DL (ref 240–430)
IRON SATN MFR SERPL: 27 % (ref 15–46)
IRON SERPL-MCNC: 100 UG/DL (ref 37–145)
MCH RBC QN AUTO: 29.8 PG (ref 26.5–33)
MCHC RBC AUTO-ENTMCNC: 33.6 G/DL (ref 31.5–36.5)
MCV RBC AUTO: 89 FL (ref 78–100)
PLATELET # BLD AUTO: 198 10E3/UL (ref 150–450)
POTASSIUM SERPL-SCNC: 4.3 MMOL/L (ref 3.4–5.3)
PROT SERPL-MCNC: 6.2 G/DL (ref 6.4–8.3)
RBC # BLD AUTO: 4.59 10E6/UL (ref 3.8–5.2)
SODIUM SERPL-SCNC: 140 MMOL/L (ref 136–145)
WBC # BLD AUTO: 4.6 10E3/UL (ref 4–11)

## 2023-02-01 PROCEDURE — 36415 COLL VENOUS BLD VENIPUNCTURE: CPT | Performed by: PHYSICIAN ASSISTANT

## 2023-02-01 PROCEDURE — 83540 ASSAY OF IRON: CPT | Performed by: PHYSICIAN ASSISTANT

## 2023-02-01 PROCEDURE — 85027 COMPLETE CBC AUTOMATED: CPT | Performed by: PHYSICIAN ASSISTANT

## 2023-02-01 PROCEDURE — 80053 COMPREHEN METABOLIC PANEL: CPT | Performed by: PHYSICIAN ASSISTANT

## 2023-02-01 PROCEDURE — 82728 ASSAY OF FERRITIN: CPT | Performed by: PHYSICIAN ASSISTANT

## 2023-02-01 PROCEDURE — 82306 VITAMIN D 25 HYDROXY: CPT | Performed by: PHYSICIAN ASSISTANT

## 2023-02-01 PROCEDURE — 83550 IRON BINDING TEST: CPT | Performed by: PHYSICIAN ASSISTANT

## 2023-02-01 PROCEDURE — 99213 OFFICE O/P EST LOW 20 MIN: CPT | Performed by: PHYSICIAN ASSISTANT

## 2023-02-01 RX ORDER — SPIRONOLACTONE 100 MG/1
100 TABLET, FILM COATED ORAL DAILY
Qty: 90 TABLET | Refills: 3 | Status: SHIPPED | OUTPATIENT
Start: 2023-02-01 | End: 2024-04-15

## 2023-02-01 RX ORDER — ETHINYL ESTRADIOL/DROSPIRENONE 0.02-3(28)
1 TABLET ORAL DAILY
Qty: 84 TABLET | Refills: 3 | Status: SHIPPED | OUTPATIENT
Start: 2023-02-01 | End: 2024-03-29

## 2023-02-01 ASSESSMENT — ANXIETY QUESTIONNAIRES
4. TROUBLE RELAXING: NOT AT ALL
1. FEELING NERVOUS, ANXIOUS, OR ON EDGE: NOT AT ALL
GAD7 TOTAL SCORE: 0
2. NOT BEING ABLE TO STOP OR CONTROL WORRYING: NOT AT ALL
IF YOU CHECKED OFF ANY PROBLEMS ON THIS QUESTIONNAIRE, HOW DIFFICULT HAVE THESE PROBLEMS MADE IT FOR YOU TO DO YOUR WORK, TAKE CARE OF THINGS AT HOME, OR GET ALONG WITH OTHER PEOPLE: NOT DIFFICULT AT ALL
GAD7 TOTAL SCORE: 0
8. IF YOU CHECKED OFF ANY PROBLEMS, HOW DIFFICULT HAVE THESE MADE IT FOR YOU TO DO YOUR WORK, TAKE CARE OF THINGS AT HOME, OR GET ALONG WITH OTHER PEOPLE?: NOT DIFFICULT AT ALL
GAD7 TOTAL SCORE: 0
5. BEING SO RESTLESS THAT IT IS HARD TO SIT STILL: NOT AT ALL
6. BECOMING EASILY ANNOYED OR IRRITABLE: NOT AT ALL
3. WORRYING TOO MUCH ABOUT DIFFERENT THINGS: NOT AT ALL
7. FEELING AFRAID AS IF SOMETHING AWFUL MIGHT HAPPEN: NOT AT ALL
7. FEELING AFRAID AS IF SOMETHING AWFUL MIGHT HAPPEN: NOT AT ALL

## 2023-02-01 ASSESSMENT — PAIN SCALES - GENERAL: PAINLEVEL: NO PAIN (0)

## 2023-02-01 NOTE — PROGRESS NOTES
Assessment & Plan     Acne, unspecified acne type  Symptoms controlled with med  Of note, generic ADRIÁN flag with dairy allergen, so ordered CRISTIAN name brand.   - spironolactone (ALDACTONE) 100 MG tablet; Take 1 tablet (100 mg) by mouth daily  - ADRIÁN 3-0.02 MG tablet; Take 1 tablet by mouth daily  - Comprehensive metabolic panel (BMP + Alb, Alk Phos, ALT, AST, Total. Bili, TP); Future  - Comprehensive metabolic panel (BMP + Alb, Alk Phos, ALT, AST, Total. Bili, TP)    Celiac disease  Symptoms controlled with diet.   Await labs.   - CBC with platelets; Future  - Ferritin; Future  - Iron and iron binding capacity; Future  - Vitamin D Deficiency; Future  - Comprehensive metabolic panel (BMP + Alb, Alk Phos, ALT, AST, Total. Bili, TP); Future  - CBC with platelets  - Ferritin  - Iron and iron binding capacity  - Vitamin D Deficiency  - Comprehensive metabolic panel (BMP + Alb, Alk Phos, ALT, AST, Total. Bili, TP)    Vegetarian diet  Await labs. Not taking supplements consistenly   - CBC with platelets; Future  - Ferritin; Future  - Iron and iron binding capacity; Future  - Comprehensive metabolic panel (BMP + Alb, Alk Phos, ALT, AST, Total. Bili, TP); Future  - CBC with platelets  - Ferritin  - Iron and iron binding capacity  - Comprehensive metabolic panel (BMP + Alb, Alk Phos, ALT, AST, Total. Bili, TP)                 No follow-ups on file.    TAQUERIA Schultz [facility]    Aleksandr Patrick is a 22 year old, presenting for the following health issues:  Recheck Medication      History of Present Illness       Reason for visit:  Blood work    She eats 4 or more servings of fruits and vegetables daily.She consumes 1 sweetened beverage(s) daily.She exercises with enough effort to increase her heart rate 60 or more minutes per day.  She exercises with enough effort to increase her heart rate 5 days per week. She is missing 1 dose(s) of medications per week.    Today's PHQ-9         PHQ-9  "Total Score: 3    PHQ-9 Q9 Thoughts of better off dead/self-harm past 2 weeks :   Not at all    How difficult have these problems made it for you to do your work, take care of things at home, or get along with other people: Somewhat difficult  Today's HALI-7 Score: 0       Celiac Disease -symptoms controlled. Monitoring diet  Patient follows vegetarian diet and would like to have labs and iron checked.           Review of Systems   Constitutional, HEENT, cardiovascular, pulmonary, gi and gu systems are negative, except as otherwise noted.      Objective    /80 (BP Location: Right arm, Patient Position: Sitting, Cuff Size: Adult Regular)   Pulse 77   Temp 98.3  F (36.8  C) (Oral)   Resp 20   Ht 1.562 m (5' 1.5\")   Wt 58.1 kg (128 lb)   LMP 01/07/2023 (Approximate)   SpO2 100%   BMI 23.79 kg/m    Body mass index is 23.79 kg/m .  Physical Exam   GENERAL APPEARANCE: healthy, alert and no distress  PSYCH: mentation appears normal and affect normal/bright                    "

## 2023-02-02 LAB — DEPRECATED CALCIDIOL+CALCIFEROL SERPL-MC: 29 UG/L (ref 20–75)

## 2023-05-09 DIAGNOSIS — L70.9 ACNE, UNSPECIFIED ACNE TYPE: ICD-10-CM

## 2023-05-09 RX ORDER — TRETINOIN 0.4 MG/G
GEL TOPICAL DAILY
Qty: 45 G | Refills: 10 | Status: SHIPPED | OUTPATIENT
Start: 2023-05-09 | End: 2024-04-15

## 2023-05-18 ENCOUNTER — PATIENT OUTREACH (OUTPATIENT)
Dept: CARE COORDINATION | Facility: CLINIC | Age: 23
End: 2023-05-18
Payer: COMMERCIAL

## 2023-06-02 ENCOUNTER — PATIENT OUTREACH (OUTPATIENT)
Dept: CARE COORDINATION | Facility: CLINIC | Age: 23
End: 2023-06-02
Payer: COMMERCIAL

## 2023-09-16 ENCOUNTER — HEALTH MAINTENANCE LETTER (OUTPATIENT)
Age: 23
End: 2023-09-16

## 2024-03-29 DIAGNOSIS — L70.9 ACNE, UNSPECIFIED ACNE TYPE: ICD-10-CM

## 2024-03-29 RX ORDER — DROSPIRENONE AND ETHINYL ESTRADIOL TABLETS 0.02-3(28)
1 KIT ORAL DAILY
Qty: 84 TABLET | Refills: 0 | Status: SHIPPED | OUTPATIENT
Start: 2024-03-29 | End: 2024-04-15

## 2024-03-29 NOTE — TELEPHONE ENCOUNTER
Please call patient. Sending 1 refill but due for appointment.    Antonio Moss PA-C on 3/29/2024 at 3:42 PM (covering for Dora Leggett)

## 2024-04-15 ENCOUNTER — OFFICE VISIT (OUTPATIENT)
Dept: FAMILY MEDICINE | Facility: CLINIC | Age: 24
End: 2024-04-15
Payer: COMMERCIAL

## 2024-04-15 VITALS
DIASTOLIC BLOOD PRESSURE: 75 MMHG | HEIGHT: 61 IN | WEIGHT: 122.6 LBS | TEMPERATURE: 98.5 F | RESPIRATION RATE: 16 BRPM | BODY MASS INDEX: 23.15 KG/M2 | OXYGEN SATURATION: 100 % | SYSTOLIC BLOOD PRESSURE: 113 MMHG | HEART RATE: 69 BPM

## 2024-04-15 DIAGNOSIS — R74.8 ELEVATED LIVER ENZYMES: ICD-10-CM

## 2024-04-15 DIAGNOSIS — Z78.9 VEGETARIAN DIET: ICD-10-CM

## 2024-04-15 DIAGNOSIS — L70.9 ACNE, UNSPECIFIED ACNE TYPE: ICD-10-CM

## 2024-04-15 DIAGNOSIS — Z13.220 SCREENING FOR HYPERLIPIDEMIA: ICD-10-CM

## 2024-04-15 DIAGNOSIS — Z12.4 CERVICAL CANCER SCREENING: ICD-10-CM

## 2024-04-15 DIAGNOSIS — Z11.3 SCREENING EXAMINATION FOR VENEREAL DISEASE: ICD-10-CM

## 2024-04-15 DIAGNOSIS — K90.0 CELIAC DISEASE: ICD-10-CM

## 2024-04-15 DIAGNOSIS — E50.9 VITAMIN A DEFICIENCY: ICD-10-CM

## 2024-04-15 DIAGNOSIS — Z00.00 ROUTINE GENERAL MEDICAL EXAMINATION AT A HEALTH CARE FACILITY: Primary | ICD-10-CM

## 2024-04-15 DIAGNOSIS — Z91.011 MILK ALLERGY: ICD-10-CM

## 2024-04-15 DIAGNOSIS — D72.819 LEUKOPENIA, UNSPECIFIED TYPE: ICD-10-CM

## 2024-04-15 LAB
ALBUMIN SERPL BCG-MCNC: 4.5 G/DL (ref 3.5–5.2)
ALP SERPL-CCNC: 57 U/L (ref 40–150)
ALT SERPL W P-5'-P-CCNC: 20 U/L (ref 0–50)
ANION GAP SERPL CALCULATED.3IONS-SCNC: 9 MMOL/L (ref 7–15)
AST SERPL W P-5'-P-CCNC: 23 U/L (ref 0–45)
BILIRUB SERPL-MCNC: 0.3 MG/DL
BUN SERPL-MCNC: 7 MG/DL (ref 6–20)
C TRACH DNA SPEC QL NAA+PROBE: NEGATIVE
CALCIUM SERPL-MCNC: 9.2 MG/DL (ref 8.6–10)
CHLORIDE SERPL-SCNC: 105 MMOL/L (ref 98–107)
CHOLEST SERPL-MCNC: 130 MG/DL
CREAT SERPL-MCNC: 0.77 MG/DL (ref 0.51–0.95)
DEPRECATED HCO3 PLAS-SCNC: 25 MMOL/L (ref 22–29)
EGFRCR SERPLBLD CKD-EPI 2021: >90 ML/MIN/1.73M2
ERYTHROCYTE [DISTWIDTH] IN BLOOD BY AUTOMATED COUNT: 11.8 % (ref 10–15)
FASTING STATUS PATIENT QL REPORTED: YES
FERRITIN SERPL-MCNC: 39 NG/ML (ref 6–175)
FOLATE SERPL-MCNC: 9.6 NG/ML (ref 4.6–34.8)
GLUCOSE SERPL-MCNC: 92 MG/DL (ref 70–99)
HCT VFR BLD AUTO: 42.8 % (ref 35–47)
HDLC SERPL-MCNC: 52 MG/DL
HGB BLD-MCNC: 14.9 G/DL (ref 11.7–15.7)
HIV 1+2 AB+HIV1 P24 AG SERPL QL IA: NONREACTIVE
IRON BINDING CAPACITY (ROCHE): 319 UG/DL (ref 240–430)
IRON SATN MFR SERPL: 21 % (ref 15–46)
IRON SERPL-MCNC: 66 UG/DL (ref 37–145)
LDLC SERPL CALC-MCNC: 60 MG/DL
MCH RBC QN AUTO: 30 PG (ref 26.5–33)
MCHC RBC AUTO-ENTMCNC: 34.8 G/DL (ref 31.5–36.5)
MCV RBC AUTO: 86 FL (ref 78–100)
N GONORRHOEA DNA SPEC QL NAA+PROBE: NEGATIVE
NONHDLC SERPL-MCNC: 78 MG/DL
PLATELET # BLD AUTO: 196 10E3/UL (ref 150–450)
POTASSIUM SERPL-SCNC: 4.2 MMOL/L (ref 3.4–5.3)
PROT SERPL-MCNC: 6.9 G/DL (ref 6.4–8.3)
RBC # BLD AUTO: 4.97 10E6/UL (ref 3.8–5.2)
SODIUM SERPL-SCNC: 139 MMOL/L (ref 135–145)
T PALLIDUM AB SER QL: NONREACTIVE
TRIGL SERPL-MCNC: 90 MG/DL
TSH SERPL DL<=0.005 MIU/L-ACNC: 2.36 UIU/ML (ref 0.3–4.2)
VIT B12 SERPL-MCNC: 525 PG/ML (ref 232–1245)
VIT D+METAB SERPL-MCNC: 21 NG/ML (ref 20–50)
WBC # BLD AUTO: 3.4 10E3/UL (ref 4–11)

## 2024-04-15 PROCEDURE — 85027 COMPLETE CBC AUTOMATED: CPT | Performed by: PHYSICIAN ASSISTANT

## 2024-04-15 PROCEDURE — 84443 ASSAY THYROID STIM HORMONE: CPT | Performed by: PHYSICIAN ASSISTANT

## 2024-04-15 PROCEDURE — 99395 PREV VISIT EST AGE 18-39: CPT | Performed by: PHYSICIAN ASSISTANT

## 2024-04-15 PROCEDURE — G0124 SCREEN C/V THIN LAYER BY MD: HCPCS | Performed by: PATHOLOGY

## 2024-04-15 PROCEDURE — 87491 CHLMYD TRACH DNA AMP PROBE: CPT | Performed by: PHYSICIAN ASSISTANT

## 2024-04-15 PROCEDURE — 82746 ASSAY OF FOLIC ACID SERUM: CPT | Performed by: PHYSICIAN ASSISTANT

## 2024-04-15 PROCEDURE — 87389 HIV-1 AG W/HIV-1&-2 AB AG IA: CPT | Performed by: PHYSICIAN ASSISTANT

## 2024-04-15 PROCEDURE — 82306 VITAMIN D 25 HYDROXY: CPT | Performed by: PHYSICIAN ASSISTANT

## 2024-04-15 PROCEDURE — 82728 ASSAY OF FERRITIN: CPT | Performed by: PHYSICIAN ASSISTANT

## 2024-04-15 PROCEDURE — 80061 LIPID PANEL: CPT | Performed by: PHYSICIAN ASSISTANT

## 2024-04-15 PROCEDURE — 99000 SPECIMEN HANDLING OFFICE-LAB: CPT | Performed by: PHYSICIAN ASSISTANT

## 2024-04-15 PROCEDURE — 86780 TREPONEMA PALLIDUM: CPT | Performed by: PHYSICIAN ASSISTANT

## 2024-04-15 PROCEDURE — 36415 COLL VENOUS BLD VENIPUNCTURE: CPT | Performed by: PHYSICIAN ASSISTANT

## 2024-04-15 PROCEDURE — 84590 ASSAY OF VITAMIN A: CPT | Mod: 90 | Performed by: PHYSICIAN ASSISTANT

## 2024-04-15 PROCEDURE — 83550 IRON BINDING TEST: CPT | Performed by: PHYSICIAN ASSISTANT

## 2024-04-15 PROCEDURE — 87591 N.GONORRHOEAE DNA AMP PROB: CPT | Performed by: PHYSICIAN ASSISTANT

## 2024-04-15 PROCEDURE — 83540 ASSAY OF IRON: CPT | Performed by: PHYSICIAN ASSISTANT

## 2024-04-15 PROCEDURE — 80053 COMPREHEN METABOLIC PANEL: CPT | Performed by: PHYSICIAN ASSISTANT

## 2024-04-15 PROCEDURE — G0145 SCR C/V CYTO,THINLAYER,RESCR: HCPCS | Performed by: PHYSICIAN ASSISTANT

## 2024-04-15 PROCEDURE — 82607 VITAMIN B-12: CPT | Performed by: PHYSICIAN ASSISTANT

## 2024-04-15 PROCEDURE — 87624 HPV HI-RISK TYP POOLED RSLT: CPT | Performed by: PHYSICIAN ASSISTANT

## 2024-04-15 RX ORDER — DROSPIRENONE AND ETHINYL ESTRADIOL 0.02-3(28)
1 KIT ORAL DAILY
Qty: 84 TABLET | Refills: 4 | Status: SHIPPED | OUTPATIENT
Start: 2024-04-15

## 2024-04-15 RX ORDER — SPIRONOLACTONE 25 MG/1
TABLET ORAL
Qty: 44 TABLET | Refills: 0 | Status: SHIPPED | OUTPATIENT
Start: 2024-04-15 | End: 2024-05-22

## 2024-04-15 RX ORDER — TRETINOIN 0.4 MG/G
GEL TOPICAL DAILY
Qty: 45 G | Refills: 10 | Status: SHIPPED | OUTPATIENT
Start: 2024-04-15

## 2024-04-15 SDOH — HEALTH STABILITY: PHYSICAL HEALTH: ON AVERAGE, HOW MANY DAYS PER WEEK DO YOU ENGAGE IN MODERATE TO STRENUOUS EXERCISE (LIKE A BRISK WALK)?: 6 DAYS

## 2024-04-15 ASSESSMENT — ANXIETY QUESTIONNAIRES
GAD7 TOTAL SCORE: 1
7. FEELING AFRAID AS IF SOMETHING AWFUL MIGHT HAPPEN: NOT AT ALL
6. BECOMING EASILY ANNOYED OR IRRITABLE: NOT AT ALL
2. NOT BEING ABLE TO STOP OR CONTROL WORRYING: NOT AT ALL
3. WORRYING TOO MUCH ABOUT DIFFERENT THINGS: NOT AT ALL
7. FEELING AFRAID AS IF SOMETHING AWFUL MIGHT HAPPEN: NOT AT ALL
IF YOU CHECKED OFF ANY PROBLEMS ON THIS QUESTIONNAIRE, HOW DIFFICULT HAVE THESE PROBLEMS MADE IT FOR YOU TO DO YOUR WORK, TAKE CARE OF THINGS AT HOME, OR GET ALONG WITH OTHER PEOPLE: NOT DIFFICULT AT ALL
8. IF YOU CHECKED OFF ANY PROBLEMS, HOW DIFFICULT HAVE THESE MADE IT FOR YOU TO DO YOUR WORK, TAKE CARE OF THINGS AT HOME, OR GET ALONG WITH OTHER PEOPLE?: NOT DIFFICULT AT ALL
GAD7 TOTAL SCORE: 1
GAD7 TOTAL SCORE: 1
5. BEING SO RESTLESS THAT IT IS HARD TO SIT STILL: SEVERAL DAYS
1. FEELING NERVOUS, ANXIOUS, OR ON EDGE: NOT AT ALL
4. TROUBLE RELAXING: NOT AT ALL

## 2024-04-15 ASSESSMENT — PAIN SCALES - GENERAL: PAINLEVEL: NO PAIN (0)

## 2024-04-15 ASSESSMENT — SOCIAL DETERMINANTS OF HEALTH (SDOH): HOW OFTEN DO YOU GET TOGETHER WITH FRIENDS OR RELATIVES?: ONCE A WEEK

## 2024-04-15 NOTE — PROGRESS NOTES
Preventive Care Visit  [facility]  Nafisa Thomas PA-C, Family Medicine  Apr 15, 2024      Assessment & Plan     Routine general medical examination at a health care facility  Generally doing well.  Declines COVID hot. Too late in the season for influenza shot.  Pap due and obtained.  - Comprehensive metabolic panel (BMP + Alb, Alk Phos, ALT, AST, Total. Bili, TP); Future  - CBC with platelets; Future  - Vitamin B12; Future  - Vitamin D Deficiency; Future  - Folate; Future  - Iron and iron binding capacity; Future  - Ferritin; Future  - NEISSERIA GONORRHOEA PCR; Future  - CHLAMYDIA TRACHOMATIS PCR; Future  - HIV Antigen Antibody Combo; Future  - Treponema Abs w Reflex to RPR and Titer; Future  - NEISSERIA GONORRHOEA PCR  - CHLAMYDIA TRACHOMATIS PCR  - TSH with free T4 reflex; Future  - Comprehensive metabolic panel (BMP + Alb, Alk Phos, ALT, AST, Total. Bili, TP)  - CBC with platelets  - Vitamin B12  - Vitamin D Deficiency  - Folate  - Iron and iron binding capacity  - Ferritin  - HIV Antigen Antibody Combo  - Treponema Abs w Reflex to RPR and Titer  - TSH with free T4 reflex    Screening for hyperlipidemia    - Lipid panel reflex to direct LDL Fasting; Future  - Lipid panel reflex to direct LDL Fasting    Cervical cancer screening  Due obtained today.  - Pap Screen reflex to HPV if ASCUS - recommend age 25 - 29    Acne, unspecified acne type  Refilled medications as noted below. She would like to stop spironolactone since birth control is working well for her acne. Taper provided.  - spironolactone (ALDACTONE) 25 MG tablet; Take 2 tablets (50 mg) by mouth daily for 7 days, THEN 1 tablet (25 mg) daily for 30 days.  - tretinoin microsphere (RETIN-A MICRO) 0.04 % external gel; Apply topically daily  - drospirenone-ethinyl estradiol (LORYNA) 3-0.02 MG tablet; Take 1 tablet by mouth daily    Celiac disease  On gluten free diet. Doing well. Higher risk for vitamin deficiencies due to  nutrition limitations and thus labs obtained as noted below.  - Comprehensive metabolic panel (BMP + Alb, Alk Phos, ALT, AST, Total. Bili, TP); Future  - CBC with platelets; Future  - Vitamin B12; Future  - Vitamin D Deficiency; Future  - Folate; Future  - Iron and iron binding capacity; Future  - Ferritin; Future  - Comprehensive metabolic panel (BMP + Alb, Alk Phos, ALT, AST, Total. Bili, TP)  - CBC with platelets  - Vitamin B12  - Vitamin D Deficiency  - Folate  - Iron and iron binding capacity  - Ferritin    Vegetarian diet    - Vitamin B12; Future  - Vitamin D Deficiency; Future  - Folate; Future  - Iron and iron binding capacity; Future  - Ferritin; Future  - Vitamin B12  - Vitamin D Deficiency  - Folate  - Iron and iron binding capacity  - Ferritin    Vitamin A deficiency  History of vitamin A deficiency. Recheck today.  - Vitamin A; Future  - Vitamin A    Milk allergy  Tolerates small amounts. Checking labs due to limited diet.  - Vitamin B12; Future  - Vitamin D Deficiency; Future  - Folate; Future  - Vitamin B12  - Vitamin D Deficiency  - Folate    Elevated liver enzymes  Recheck. Has been normal since the hepatitis elevation from a couple years ago.   - Comprehensive metabolic panel (BMP + Alb, Alk Phos, ALT, AST, Total. Bili, TP); Future  - Comprehensive metabolic panel (BMP + Alb, Alk Phos, ALT, AST, Total. Bili, TP)    Screening examination for venereal disease  Screening testing obtained today.  - NEISSERIA GONORRHOEA PCR; Future  - CHLAMYDIA TRACHOMATIS PCR; Future  - HIV Antigen Antibody Combo; Future  - Treponema Abs w Reflex to RPR and Titer; Future  - NEISSERIA GONORRHOEA PCR  - CHLAMYDIA TRACHOMATIS PCR  - HIV Antigen Antibody Combo  - Treponema Abs w Reflex to RPR and Titer    Leukopenia, unspecified type   Slight. Recheck in 2-3 months.    Patient has been advised of split billing requirements and indicates understanding: No  Review of external notes as documented elsewhere in note  Ordering  of each unique test  Prescription drug management    Counseling  Appropriate preventive services were discussed with this patient, including applicable screening as appropriate for fall prevention, nutrition, physical activity, Tobacco-use cessation, weight loss and cognition.  Checklist reviewing preventive services available has been given to the patient.  Reviewed patient's diet, addressing concerns and/or questions.         Aleksandr Patrick is a 24 year old, presenting for the following:  Physical (Fasting./) and Blood Draw (Vegetarian and gluten free would like vitamin levels checked.)        4/15/2024     8:06 AM   Additional Questions   Roomed by Delicia SPRINGER MA      Health Care Directive  Patient does not have a Health Care Directive or Living Will: Discussed advance care planning with patient; information given to patient to review.    HPI    Would like vitamin levels checked. Vegetarian and gluten free diet.           4/15/2024   General Health   How would you rate your overall physical health? Excellent   Feel stress (tense, anxious, or unable to sleep) Not at all         4/15/2024   Nutrition   Three or more servings of calcium each day? Yes   Diet: Vegetarian/vegan    Gluten-free/reduced   How many servings of fruit and vegetables per day? (!) I DON'T KNOW   How many sweetened beverages each day? 0-1         4/15/2024   Exercise   Days per week of moderate/strenous exercise 6 days         4/15/2024   Social Factors   Frequency of gathering with friends or relatives Once a week   Worry food won't last until get money to buy more No   Food not last or not have enough money for food? No   Do you have housing?  Yes   Are you worried about losing your housing? No   Lack of transportation? No   Unable to get utilities (heat,electricity)? No         4/15/2024   Dental   Dentist two times every year? Yes         4/15/2024   TB Screening   Were you born outside of the [place]? No     Today's PHQ-9 Score:        "4/15/2024     8:07 AM   PHQ-9 SCORE   PHQ-9 Total Score MyChart 0   PHQ-9 Total Score 0         4/15/2024   Substance Use   Alcohol more than 3/day or more than 7/wk No   Do you use any other substances recreationally? No     Social History     Tobacco Use    Smoking status: Never    Smokeless tobacco: Never   Vaping Use    Vaping status: Never Used   Substance Use Topics    Alcohol use: Yes     Comment: On the weekends    Drug use: No     Mammogram Screening - Patient under 40 years of age: Routine Mammogram Screening not recommended.         4/15/2024   STI Screening   New sexual partner(s) since last STI/HIV test? No     History of abnormal Pap smear: NO - age 21-29 PAP every 3 years recommended        12/10/2020     1:38 PM   PAP / HPV   PAP (Historical) NIL          4/15/2024   Contraception/Family Planning   Questions about contraception or family planning No        Reviewed and updated as needed this visit by Provider   Tobacco  Allergies  Meds  Problems  Med Hx  Surg Hx  Fam Hx               Objective    Exam  /75 (BP Location: Right arm, Patient Position: Sitting, Cuff Size: Adult Regular)   Pulse 69   Temp 98.5  F (36.9  C) (Oral)   Resp 16   Ht 1.549 m (5' 1\")   Wt 55.6 kg (122 lb 9.6 oz)   LMP 03/20/2024 (Approximate)   SpO2 100%   BMI 23.17 kg/m     Estimated body mass index is 23.17 kg/m  as calculated from the following:    Height as of this encounter: 1.549 m (5' 1\").    Weight as of this encounter: 55.6 kg (122 lb 9.6 oz).    Physical Exam  GENERAL: alert and no distress  EYES: Eyes grossly normal to inspection, PERRL and conjunctivae and sclerae normal  HENT: ear canals and TM's normal, nose and mouth without ulcers or lesions  NECK: no adenopathy, no asymmetry, masses, or scars  RESP: lungs clear to auscultation - no rales, rhonchi or wheezes  BREAST: Defers today, does self exams  CV: regular rate and rhythm, normal S1 S2, no S3 or S4, no murmur, click or rub, no peripheral " edema  ABDOMEN: soft, nontender, no hepatosplenomegaly, no masses and bowel sounds normal   (female): normal female external genitalia, normal urethral meatus, normal vaginal mucosa  MS: no gross musculoskeletal defects noted, no edema  SKIN: no suspicious lesions or rashes  NEURO: Normal strength and tone, mentation intact and speech normal  PSYCH: mentation appears normal, affect normal/bright    Signed Electronically by: Nafisa Thomas PA-C

## 2024-04-15 NOTE — PATIENT INSTRUCTIONS
Preventive Care Advice   This is general advice given by our system to help you stay healthy. However, your care team may have specific advice just for you. Please talk to your care team about your preventive care needs.  Nutrition  Eat 5 or more servings of fruits and vegetables each day.  Try wheat bread, brown rice and whole grain pasta (instead of white bread, rice, and pasta).  Get enough calcium and vitamin D. Check the label on foods and aim for 100% of the RDA (recommended daily allowance).  Lifestyle  Exercise at least 150 minutes each week   (30 minutes a day, 5 days a week).  Do muscle strengthening activities 2 days a week. These help control your weight and prevent disease.  No smoking.  Wear sunscreen to prevent skin cancer.  Have a dental exam and cleaning every 6 months.  Yearly exams  See your health care team every year to talk about:  Any changes in your health.  Any medicines your care team has prescribed.  Preventive care, family planning, and ways to prevent chronic diseases.  Shots (vaccines)   HPV shots (up to age 26), if you've never had them before.  Hepatitis B shots (up to age 59), if you've never had them before.  COVID-19 shot: Get this shot when it's due.  Flu shot: Get a flu shot every year.  Tetanus shot: Get a tetanus shot every 10 years.  Pneumococcal, hepatitis A, and RSV shots: Ask your care team if you need these based on your risk.  Shingles shot (for age 50 and up).  General health tests  Diabetes screening:  Starting at age 35, Get screened for diabetes at least every 3 years.  If you are younger than age 35, ask your care team if you should be screened for diabetes.  Cholesterol test: At age 39, start having a cholesterol test every 5 years, or more often if advised.  Bone density scan (DEXA): At age 50, ask your care team if you should have this scan for osteoporosis (brittle bones).  Hepatitis C: Get tested at least once in your life.  STIs (sexually transmitted  infections)  Before age 24: Ask your care team if you should be screened for STIs.  After age 24: Get screened for STIs if you're at risk. You are at risk for STIs (including HIV) if:  You are sexually active with more than one person.  You don't use condoms every time.  You or a partner was diagnosed with a sexually transmitted infection.  If you are at risk for HIV, ask about PrEP medicine to prevent HIV.  Get tested for HIV at least once in your life, whether you are at risk for HIV or not.  Cancer screening tests  Cervical cancer screening: If you have a cervix, begin getting regular cervical cancer screening tests at age 21. Most people who have regular screenings with normal results can stop after age 65. Talk about this with your provider.  Breast cancer scan (mammogram): If you've ever had breasts, begin having regular mammograms starting at age 40. This is a scan to check for breast cancer.  Colon cancer screening: It is important to start screening for colon cancer at age 45.  Have a colonoscopy test every 10 years (or more often if you're at risk) Or, ask your provider about stool tests like a FIT test every year or Cologuard test every 3 years.  To learn more about your testing options, visit: https://[email or web address].  For help making a decision, visit: https://bit.ly/zb24151.  Prostate cancer screening test: If you have a prostate and are age 55 to 69, ask your provider if you would benefit from a yearly prostate cancer screening test.  Lung cancer screening: If you are a current or former smoker age 50 to 80, ask your care team if ongoing lung cancer screenings are right for you.  For informational purposes only. Not to replace the advice of your health care provider. Copyright   2023 [employer]. All rights reserved. Clinically reviewed by the [facility] Transitions Program. StyleQ 577332 - REV 01/24.

## 2024-04-18 ENCOUNTER — MYC MEDICAL ADVICE (OUTPATIENT)
Dept: FAMILY MEDICINE | Facility: CLINIC | Age: 24
End: 2024-04-18
Payer: COMMERCIAL

## 2024-04-18 LAB
ANNOTATION COMMENT IMP: NORMAL
BKR LAB AP GYN ADEQUACY: ABNORMAL
BKR LAB AP GYN INTERPRETATION: ABNORMAL
BKR LAB AP HPV REFLEX: ABNORMAL
BKR LAB AP PREVIOUS ABNORMAL: ABNORMAL
PATH REPORT.COMMENTS IMP SPEC: ABNORMAL
PATH REPORT.COMMENTS IMP SPEC: ABNORMAL
PATH REPORT.RELEVANT HX SPEC: ABNORMAL
RETINYL PALMITATE SERPL-MCNC: 0.03 MG/L
VIT A SERPL-MCNC: 0.46 MG/L

## 2024-04-19 NOTE — TELEPHONE ENCOUNTER
See [employer] message below.  Routing to [facility] pool to address.  Lotus Irwin RN

## 2024-04-22 ENCOUNTER — PATIENT OUTREACH (OUTPATIENT)
Dept: FAMILY MEDICINE | Facility: CLINIC | Age: 24
End: 2024-04-22
Payer: COMMERCIAL

## 2024-04-22 PROBLEM — R87.610 ASCUS WITH POSITIVE HIGH RISK HPV CERVICAL: Status: ACTIVE | Noted: 2024-04-22

## 2024-04-22 PROBLEM — R87.810 ASCUS WITH POSITIVE HIGH RISK HPV CERVICAL: Status: ACTIVE | Noted: 2024-04-22

## 2024-04-22 LAB
HUMAN PAPILLOMA VIRUS 16 DNA: NEGATIVE
HUMAN PAPILLOMA VIRUS 18 DNA: NEGATIVE
HUMAN PAPILLOMA VIRUS FINAL DIAGNOSIS: ABNORMAL
HUMAN PAPILLOMA VIRUS OTHER HR: POSITIVE

## 2024-06-07 DIAGNOSIS — L70.9 ACNE, UNSPECIFIED ACNE TYPE: ICD-10-CM

## 2024-06-07 RX ORDER — DROSPIRENONE AND ETHINYL ESTRADIOL 0.02-3(28)
1 KIT ORAL DAILY
Qty: 84 TABLET | Refills: 4 | OUTPATIENT
Start: 2024-06-07

## 2025-04-15 PROBLEM — R87.810 ASCUS WITH POSITIVE HIGH RISK HPV CERVICAL: Status: ACTIVE | Noted: 2024-04-22

## 2025-04-15 PROBLEM — R87.610 ASCUS WITH POSITIVE HIGH RISK HPV CERVICAL: Status: ACTIVE | Noted: 2024-04-22

## 2025-05-31 ENCOUNTER — HEALTH MAINTENANCE LETTER (OUTPATIENT)
Age: 25
End: 2025-05-31

## (undated) RX ORDER — FENTANYL CITRATE 50 UG/ML
INJECTION, SOLUTION INTRAMUSCULAR; INTRAVENOUS
Status: DISPENSED
Start: 2021-10-14

## (undated) RX ORDER — FLUMAZENIL 0.1 MG/ML
INJECTION, SOLUTION INTRAVENOUS
Status: DISPENSED
Start: 2021-10-14

## (undated) RX ORDER — ACETAMINOPHEN 325 MG/1
TABLET ORAL
Status: DISPENSED
Start: 2021-10-14

## (undated) RX ORDER — NALOXONE HYDROCHLORIDE 0.4 MG/ML
INJECTION, SOLUTION INTRAMUSCULAR; INTRAVENOUS; SUBCUTANEOUS
Status: DISPENSED
Start: 2021-10-14